# Patient Record
Sex: FEMALE | Race: WHITE | NOT HISPANIC OR LATINO | URBAN - METROPOLITAN AREA
[De-identification: names, ages, dates, MRNs, and addresses within clinical notes are randomized per-mention and may not be internally consistent; named-entity substitution may affect disease eponyms.]

---

## 2022-05-16 ENCOUNTER — WALK IN (OUTPATIENT)
Dept: URGENT CARE | Age: 47
End: 2022-05-16

## 2022-05-16 VITALS
TEMPERATURE: 96.5 F | HEART RATE: 75 BPM | DIASTOLIC BLOOD PRESSURE: 82 MMHG | RESPIRATION RATE: 18 BRPM | SYSTOLIC BLOOD PRESSURE: 122 MMHG

## 2022-05-16 DIAGNOSIS — N30.01 ACUTE CYSTITIS WITH HEMATURIA: Primary | ICD-10-CM

## 2022-05-16 LAB
APPEARANCE, POC: ABNORMAL
BILIRUBIN, POC: NEGATIVE
COLOR, POC: YELLOW
GLUCOSE UR-MCNC: NEGATIVE MG/DL
KETONES, POC: NEGATIVE MG/DL
NITRITE, POC: NEGATIVE
OCCULT BLOOD, POC: ABNORMAL
PH UR: 7.5 [PH] (ref 5–7)
PROT UR-MCNC: NEGATIVE MG/DL
SP GR UR: 1 (ref 1–1.03)
UROBILINOGEN UR-MCNC: 0.2 MG/DL (ref 0–1)
WBC (LEUKOCYTE) ESTERASE, POC: ABNORMAL

## 2022-05-16 PROCEDURE — 81002 URINALYSIS NONAUTO W/O SCOPE: CPT | Performed by: NURSE PRACTITIONER

## 2022-05-16 PROCEDURE — 99203 OFFICE O/P NEW LOW 30 MIN: CPT | Performed by: NURSE PRACTITIONER

## 2022-05-16 RX ORDER — NITROFURANTOIN 25; 75 MG/1; MG/1
100 CAPSULE ORAL 2 TIMES DAILY
Qty: 14 CAPSULE | Refills: 0 | Status: SHIPPED | OUTPATIENT
Start: 2022-05-16 | End: 2022-05-23

## 2022-05-16 RX ORDER — HYDROXYCHLOROQUINE SULFATE 200 MG/1
200 TABLET, FILM COATED ORAL 2 TIMES DAILY
COMMUNITY
Start: 2022-04-20

## 2022-05-16 ASSESSMENT — ENCOUNTER SYMPTOMS
CONSTITUTIONAL NEGATIVE: 1
RESPIRATORY NEGATIVE: 1

## 2024-03-15 ENCOUNTER — OFFICE VISIT (OUTPATIENT)
Dept: INTERNAL MEDICINE CLINIC | Facility: CLINIC | Age: 49
End: 2024-03-15
Payer: COMMERCIAL

## 2024-03-15 VITALS
HEART RATE: 70 BPM | DIASTOLIC BLOOD PRESSURE: 80 MMHG | HEIGHT: 65.35 IN | OXYGEN SATURATION: 97 % | BODY MASS INDEX: 48.23 KG/M2 | RESPIRATION RATE: 18 BRPM | SYSTOLIC BLOOD PRESSURE: 132 MMHG | TEMPERATURE: 97 F | WEIGHT: 293 LBS

## 2024-03-15 DIAGNOSIS — Z13.29 THYROID DISORDER SCREEN: ICD-10-CM

## 2024-03-15 DIAGNOSIS — M05.9 RHEUMATOID ARTHRITIS WITH POSITIVE RHEUMATOID FACTOR, INVOLVING UNSPECIFIED SITE (HCC): Primary | ICD-10-CM

## 2024-03-15 DIAGNOSIS — Z13.228 SCREENING FOR METABOLIC DISORDER: ICD-10-CM

## 2024-03-15 DIAGNOSIS — M17.0 OSTEOARTHRITIS OF BOTH KNEES, UNSPECIFIED OSTEOARTHRITIS TYPE: ICD-10-CM

## 2024-03-15 DIAGNOSIS — Z12.31 ENCOUNTER FOR SCREENING MAMMOGRAM FOR MALIGNANT NEOPLASM OF BREAST: ICD-10-CM

## 2024-03-15 DIAGNOSIS — Z12.11 SCREENING FOR COLON CANCER: ICD-10-CM

## 2024-03-15 DIAGNOSIS — Z13.21 ENCOUNTER FOR VITAMIN DEFICIENCY SCREENING: ICD-10-CM

## 2024-03-15 DIAGNOSIS — E66.01 MORBID OBESITY (HCC): ICD-10-CM

## 2024-03-15 DIAGNOSIS — Z13.0 SCREENING FOR DEFICIENCY ANEMIA: ICD-10-CM

## 2024-03-15 DIAGNOSIS — Z13.220 LIPID SCREENING: ICD-10-CM

## 2024-03-15 PROCEDURE — 99203 OFFICE O/P NEW LOW 30 MIN: CPT | Performed by: FAMILY MEDICINE

## 2024-03-15 PROCEDURE — 3079F DIAST BP 80-89 MM HG: CPT | Performed by: FAMILY MEDICINE

## 2024-03-15 PROCEDURE — 3075F SYST BP GE 130 - 139MM HG: CPT | Performed by: FAMILY MEDICINE

## 2024-03-15 PROCEDURE — 3008F BODY MASS INDEX DOCD: CPT | Performed by: FAMILY MEDICINE

## 2024-03-15 RX ORDER — METHOTREXATE 2.5 MG/1
TABLET ORAL
COMMUNITY
Start: 2019-07-29

## 2024-03-15 RX ORDER — PREDNISONE 5 MG/1
5 TABLET ORAL AS NEEDED
COMMUNITY
Start: 2024-03-14

## 2024-03-15 RX ORDER — METHOTREXATE 2.5 MG/1
20 TABLET ORAL WEEKLY
COMMUNITY
Start: 2024-01-10

## 2024-03-15 RX ORDER — FOLIC ACID 1 MG/1
2 TABLET ORAL 2 TIMES DAILY
COMMUNITY
Start: 2019-07-29

## 2024-03-15 NOTE — PROGRESS NOTES
Yvonne Suarez  9/9/1975    Chief Complaint   Patient presents with    [facility]     ES rm - 1 - Establish care       HPI:   Yvonne Suarez is a 48 year old female who presents to [facility]. She has RA and following with rheumatology at [place]. Also has b/l knee OA and had recent CSI with ortho at [facility]. She is interested in medical bariatrics as well.     Current Outpatient Medications   Medication Sig Dispense Refill    folic acid 1 MG Oral Tab Take 2 tablets (2 mg total) by mouth in the morning and 2 tablets (2 mg total) before bedtime.      Methotrexate Sodium (METHOTREXATE, ARTHRITIS, TABS 2.5 MG OR)       methotrexate 2.5 MG Oral Tab Take 8 tablets (20 mg total) by mouth once a week.      predniSONE 5 MG Oral Tab Take 1 tablet (5 mg total) by mouth as needed.        Not on File   Past Medical History:   Diagnosis Date    Arthritis     Obesity       There is no problem list on file for this patient.     History reviewed. No pertinent surgical history.   Family History   Problem Relation Age of Onset    Cancer Father     Cancer Mother     Obesity Brother     Diabetes Brother       Social History     Socioeconomic History    Marital status: Single   Tobacco Use    Smoking status: Never     Passive exposure: Never    Smokeless tobacco: Never   Vaping Use    Vaping Use: Never used   Substance and Sexual Activity    Alcohol use: Not Currently   Other Topics Concern    Weight Concern Yes         REVIEW OF SYSTEMS:   GENERAL: feels well otherwise    EXAM:   /80 (BP Location: Left arm, Patient Position: Sitting, Cuff Size: large)   Pulse 70   Temp 97.2 °F (36.2 °C) (Temporal)   Resp 18   Ht 5' 5.35\" (1.66 m)   Wt 296 lb 8 oz (134.5 kg)   SpO2 97%   BMI 48.81 kg/m²   GENERAL: Well developed, well nourished,in no apparent distress  HEENT: atraumatic, normocephalic  LUNGS: normal work of breathing  CARDIO: Regular rate    ASSESSMENT AND PLAN:   Yvonne Suarez is a 48 year old female who presents  to establish care    Rheumatoid arthritis with positive rheumatoid factor, involving unspecified site (HCC)  Currently following with rheumatology at [place].  She is looking to transition her care closer to [place].  Referral was placed for [facility] otology.  Will continue current management with her [place] rheumatologist at this time.  - Rheumatology Referral - In Network    Osteoarthritis of both knees, unspecified osteoarthritis type  Had recent CSI with orthopedist at [place] with improvement in symptoms.  CTM    Morbid obesity (HCC)  Referral placed to medical bariatrics.  - [facility] - [place] Location    Encounter for screening mammogram for malignant neoplasm of breast  Mammogram ordered.  - [facility] TEDDY 2D+3D SCREENING BILAT (CPT=77067/03987); Future    Screening for colon cancer  Referral placed for colonoscopy.  - Gastro Referral - In Network    Screening Labs to be done prior to CPE in Oct  - Comp Metabolic Panel (14); Future  - Vitamin B12; Future  - Vitamin D, 25-Hydroxy; Future  - Lipid Panel; Future  - CBC With Differential With Platelet; Future  - TSH W Reflex To Free T4; Future    Follow-up in October for CPE.    All questions were answered and the patient agrees with the plan.     Thank you,  Arnulfo Oneill MD

## 2024-03-19 ENCOUNTER — OFFICE VISIT (OUTPATIENT)
Dept: INTERNAL MEDICINE CLINIC | Facility: CLINIC | Age: 49
End: 2024-03-19
Payer: COMMERCIAL

## 2024-03-19 VITALS
SYSTOLIC BLOOD PRESSURE: 124 MMHG | HEIGHT: 65 IN | RESPIRATION RATE: 16 BRPM | HEART RATE: 70 BPM | WEIGHT: 291 LBS | BODY MASS INDEX: 48.48 KG/M2 | DIASTOLIC BLOOD PRESSURE: 82 MMHG

## 2024-03-19 DIAGNOSIS — K76.0 FATTY LIVER: ICD-10-CM

## 2024-03-19 DIAGNOSIS — E66.01 OBESITY, MORBID, BMI 40.0-49.9 (HCC): ICD-10-CM

## 2024-03-19 DIAGNOSIS — E78.1 HIGH TRIGLYCERIDES: ICD-10-CM

## 2024-03-19 DIAGNOSIS — Z51.81 THERAPEUTIC DRUG MONITORING: Primary | ICD-10-CM

## 2024-03-19 DIAGNOSIS — M17.4 OTHER SECONDARY OSTEOARTHRITIS OF BOTH KNEES: ICD-10-CM

## 2024-03-19 DIAGNOSIS — M05.9 RHEUMATOID ARTHRITIS WITH POSITIVE RHEUMATOID FACTOR, INVOLVING UNSPECIFIED SITE (HCC): ICD-10-CM

## 2024-03-19 PROBLEM — R20.2 PARESTHESIA: Status: ACTIVE | Noted: 2024-03-19

## 2024-03-19 PROCEDURE — 3008F BODY MASS INDEX DOCD: CPT | Performed by: NURSE PRACTITIONER

## 2024-03-19 PROCEDURE — 3074F SYST BP LT 130 MM HG: CPT | Performed by: NURSE PRACTITIONER

## 2024-03-19 PROCEDURE — 3079F DIAST BP 80-89 MM HG: CPT | Performed by: NURSE PRACTITIONER

## 2024-03-19 PROCEDURE — 99204 OFFICE O/P NEW MOD 45 MIN: CPT | Performed by: NURSE PRACTITIONER

## 2024-03-19 RX ORDER — MELOXICAM 15 MG/1
15 TABLET ORAL DAILY
COMMUNITY
Start: 2024-03-13

## 2024-03-19 NOTE — PROGRESS NOTES
HISTORY OF PRESENT ILLNESS  Chief Complaint   Patient presents with    Weight Problem     Recommendation from PCP, never tried med but tries supplement ( xyngular) and open for meds depending on what it is. Kneen problems and rheumatoid      Yvonne Suarez is a 48 year old female new to our office today for initiation of medical weight loss program.  Patient presents today with c/o excess weight. Referred by, PCP     Has been struggling weight gain after having 3 kids (about 22 yrs ago)   Was on xyngular (supplements and shakes)   Admits to having a hard to exercise- osteoarthritis in bilat. Knees (just got knee injections last week)    Denies chest pain, shortness of breath, dizziness, blurred vision, headache, paresthesia, nausea/vomiting.     Reason/goal for weight loss: to lose #50 lbs in 6 months and #100 lbs in 1 yr  Triggers for weight gain?  Soda,  skip meals   Previous weight loss programs? YES:  Weight Watchers- 2005  Previous weight loss medications?  Xyngular (supplements)     Reviewed [facility] patient contract: Readiness for Lifestyle change: 10/10, Interest in Medication: 10/10, Bariatric surgery interest: 0/10    Weight  Starting weight: 291  Max weight: 298  Lowest weight: 250    Wt Readings from Last 6 Encounters:   03/19/24 291 lb (132 kg)   03/15/24 296 lb 8 oz (134.5 kg)        Typical diet   Breakfast Lunch Dinner Snacks Fluids   Cheerios, skip, coffee  skip Omelette, pasta, stirfry  Cookies- 4, chips  Water: adequate   Soda: +2 per day  Juice:  Coffee/Tea: coffee (Citizen of [place] vanilla creamer)      Portion: medium (large for dinner)   Eats 3 meals per day: yes   Number of restaurant or fast food meals/week: 2 meals/week    Social hx and lifestyle reviewed:    Work: schedule [employer] (working from home)   Marital status: single  Support: yes  Tobacco use: none  ETOH use:  0 per/week  Supplements: none  Exercise: none  Stress level: 0/10  Sleep hours and integrity: 4-5 Hours per night    MEDICAL HISTORY  PMH  reviewed:   Cardiac disorders:negative   Depression/anxiety: negative  Glaucoma: negative  Kidney stones: negative  Eating disorder: negative  Migraines/seizures: negative  Joint-related conditions: RA, multiple joint pain, osteoarthritis in bilat knees L>R  Liver disease: fatty liver  Thyroid disease: negative  Constipation: negative  Diabetes: negative  Sleep Apnea hx: n/a   Cancer hx: negative  DVT: negative  Family or personal history of Pancreatic issues / Medullary Thyroid Cancer: negative  History of bariatric surgery: negative    FMH reviewed: obesity in parent/s or sibling: +brothers     REVIEW OF SYSTEMS  GENERAL: feels well otherwise, and negative fatigue   SKIN: denies any rashes to skin folds  HEENT: snoring- no; denies neck thickening  LUNGS: denies shortness of breath with exertion, no apnea  CARDIOVASCULAR: denies chest pain on exertion, denies palpitations or pedal edema  GI: denies abdominal pain, distention, No N/V/D/C  MUSCULOSKELETAL: denies back pain, +joint pains (RA, bilat. Knees L>R)  NEURO: denies headaches or dizziness  ENDOCRINE: denies any excess hunger, urination or thirst, denies any purple striae  PSYCH: denies change in behavior or mood, denies feeling sad or depressed    EXAM  /82   Pulse 70   Resp 16   Ht 5' 5\" (1.651 m)   Wt 291 lb (132 kg)   LMP 01/31/2024 (Approximate)   BMI 48.42 kg/m² , Percent body fat: F >32% Female 49.7%;  visceral fat 19 Muscle Mass: 30.1 lbs%       GENERAL: well developed, well nourished, in no apparent distress  SKIN: warm, pink, dry without rashes to exposed area   EYES: conjunctiva pink, sclera non icteric, PERRLA  HEENT: atraumatic, normocephalic, O/p: Mallampati score- 2  NECK: supple, non tender, no adenopathy, no thyromegaly  LUNGS: CTA in all fields, breathing non labored  CARDIO: RRR without murmur, normal S1 and S2 without clicks or gallops, no pedal edema  GI: +BS, soft, no masses, HSM or tenderness  EXTREMITIES: grossly  intact  NEURO: Oriented times three, full ROM of bilateral UE/LE  PSYCH: pleasant, cooperative, normal mood and affect    No results found for: \"GLU\", \"BUN\", \"BUNCREA\", \"CREATSERUM\", \"ANIONGAP\", \"GFR\", \"GFRNAA\", \"GFRAA\", \"CA\", \"OSMOCALC\", \"ALKPHO\", \"AST\", \"ALT\", \"ALKPHOS\", \"BILT\", \"TP\", \"ALB\", \"GLOBULIN\", \"AGRATIO\", \"NA\", \"K\", \"CL\", \"CO2\"  No results found for: \"EAG\", \"A1C\"  No results found for: \"CHOLEST\", \"TRIG\", \"HDL\", \"LDL\", \"VLDL\", \"TCHDLRATIO\", \"NONHDLC\", \"CHOLHDLRATIO\", \"CALCNONHDL\"  No results found for: \"B12\", \"VITB12\"  No results found for: \"VITD\", \"QVITD\", \"CJIP08MY\"    Current Outpatient Medications on File Prior to Visit   Medication Sig Dispense Refill    Meloxicam 15 MG Oral Tab Take 1 tablet (15 mg total) by mouth daily.      folic acid 1 MG Oral Tab Take 2 tablets (2 mg total) by mouth in the morning and 2 tablets (2 mg total) before bedtime.      methotrexate 2.5 MG Oral Tab Take 8 tablets (20 mg total) by mouth once a week.      predniSONE 5 MG Oral Tab Take 1 tablet (5 mg total) by mouth as needed (Pt taking it when needed).       No current facility-administered medications on file prior to visit.       ASSESSMENT/PLAN    ICD-10-CM    1. Therapeutic drug monitoring  Z51.81 EKG 12 Lead performed at [facility]      2. Obesity, morbid, BMI 40.0-49.9 (HCC)  E66.01 EKG 12 Lead performed at [facility]      3. Fatty liver  K76.0       4. Rheumatoid arthritis with positive rheumatoid factor, involving unspecified site (HCC)  M05.9       5. Other secondary osteoarthritis of both knees  M17.4         Initial Weight Data and Goal Weight Loss:  Weight Calculations  Initial Weight: 291 lbs  Initial Weight Date: 03/19/24  Today's Weight: 291 lbs  5% Goal: 14.55  10% Goal: 29.1  Total Weight Loss: 0 lbs  Initial consult: 291 lbs on 3/19/2024, Down  Lb:  lbs total     PLAN   Will hold on starting medications: if EKG is normal we can think about doing phentermine  --advised of side effects and adverse  effects of this medication  Contradictions: needs EKG before doing stimulant medication   Body composition test results given   Reviewed labs  Continue with vitamin d OTC   HLD  elevated triglyceride, follows with PCP   Fatty liver, lifestyle education done, managed by PCP  RA/joint pain, recommended aqua therapy   Decrease carbs, increase protein, no skipping meals   Needs to incorporate exercise regimen FITTE: ACSM recommendations (150-300 minutes/ week in active weight loss)   Follow up with dietitian and psychologist as recommended.  Discussed the role of sleep and stress in weight management.  Counseled on comprehensive weight loss plan including attention to nutrition, exercise and behavior/stress management for success. See patient instruction below for more details.    Total time spent on chart review, pre-charting, obtaining history, counseling, and educating, reviewing labs was 50 minutes.       NOTE TO PATIENT: The 21st Century Cures Act makes clinical notes like these available to patients in the interest of transparency. Clinical notes are medical documents used by physicians and care providers to communicate with each other. These documents include medical language and terminology, abbreviations, and treatment information that may sound technical and at times possibly unfamiliar. In addition, at times, the verbiage may appear blunt or direct. These documents are one tool providers use to communicate relevant information and clinical opinions of the care providers in a way that allows common understanding of the clinical context.     Weight Loss Contract reviewed and signed today 3/19/2024    Patient Instructions   Welcome to the [facility]!!  Thank you for placing your trust in our health care team, I look forward to working with you along this journey to better health!  Next steps:     1.  Schedule a personal nutrition consultation with one of our registered dieticians.  Bring along your food journal (3 days minimum). See journal options below.  2. Get EKG done    Please try to work on the following dietary changes this first month:  Daily protein recommendation to start:  grams  Daily carbohydrate: <150g  Daily calories: 1,800-1,900  1.  Drink water with meals and throughout the day, cut down on soda and/or juice if consumed. Consider flavored water options like Bubbly, Spindrift, Hint and Mouna.  2.  Eat breakfast daily and focus on having protein with each meal, examples include: greek yogurt, cottage cheese, hard boiled egg, whole grain toast with peanut butter.   3.  Reduce refined carbohydrates and sugars which includes items such as sweets, as well as rice, pasta, and bread and make sure to choose whole grain options when having them with just 1 serving per meal about the size of your inner palm.  4.  Consume non starchy veggies daily working towards making them a good 50% of your daily food intake. Add them to lunch and dinner consistently.  5.  Optional can start a daily probiotic: VSL#3, (order on line at www.vsl3.com). Take 1 capsule daily with water for 30 days, then reduce to 1 every other day (this will reduce the cost). Capsules can be left out for 2 weeks, but then must be refrigerated.      Please download randall My Fitness Pal, LoseIt! Or Net Diary to monitor daily dietary intake and you will be able to see if you are eating the right amount of calories or too much or too little which would hinder weight loss. Additionally this will help to see your daily carbohydrate and protein intake. When you set the randall up choose 1-2 lbs/week as a goal.  Keeping a paper food journal is an option as well to remain accountable for your choices- this is the start to mindful eating! A low calorie diet has been consistently shown to support weight loss.     Continue or start exercising to help establish a routine. If not already exercising begin with 1 day and progress as able  with long-term goal of 30 minutes 5 days a week at a minimum.     Meditation daily can help manage and control stress. Chronic stress can make weight loss difficult.  Exercising is one way to help with stress, but meditation using the CALM Kaitlynn or another comparable alternative can be done in your home or place of work with little time commitment. This Kaitlynn can also help work on behavior change and improve sleep. Check out the segment under Calm Masterclass and listen to The 4 Pillars of Health. A great way to begin learning about the foundation of lifestyle with practical tips to use in your every day.     Check out www.yourweightmatters.org blog for continued daily support and education along this weight loss journey!    Patient Resources:     Personal Training/Fitness Classes/Health Coaching     [facility] @ [email or web address] Full fitness center with group fitness and personal training. Discount available as client of [facility].  Health Coaching and Personal Training with Stefanie Martines at our [facility]- individual weekly coaching with option to add personal training and small group fitness classes targeted at weight loss- [phone] and/or email @ [email or web address]  360FIT [place] [email or web address] Fitness [phone] and/or email Leonie at [email or web address]  [facility] Fitness Centers with multiple locations: RJMetrics (www.[place] [employer]), Eat The 9Lenses Fitness (www.[email or web address] Core.Nirvanix), Zuora (www.Critical Outcome Technologies.Nirvanix), The Exercise  (www.exercisecoach.Nirvanix)     Online Fitness  Fitness  on Utube  Fit in 10 DVD series- www.jhsrb10JIC.com  Sit and Be Fit - Chair exercise series Www.sitandbefit.org  Hip Hop Fit with Josep Ramírez at www.hiphopfit.net     Apps for on the Go Fitness  [place] 7 Minute Workout (orange box with white 7) - free  on the go HIIT training kaitlynn  Peloton Kaitlynn @ www.onepeloton.com     Nutrition Trackers and Tools  LoseIT! And My Fitness Pal apps and on line for tracking nutrition  NOOM - virtual health coaching  FitFoundation (healthy meals on the go) in [place] @ www.knkzajbhjqlty0i.Nevolution  Emre RODRIGUEZ @ www.bistromd.com and Obcrjo61 (keto and low carb plans recommended) @ www.yoozlq16.com, Metabolic Meals @ www.MyMetabolicMeals.com - individual prepared meals to go  Gobble, Blue Apron, Home , Every Plate, Sunbasket- on line meal delivery programs for preparation at home  [place]al [place] in [place] for homemade meals to go @ www.Profex  Diet Doctor @ www.dietdoctor.com - low carb swaps  Yummly - meal prep and planning kaitlynn (www.yummly.com)     Stress Management/Behavior/Mindful Eating  CALM meditation kaitlynn (www.calm.com)  Headspace  Am I Hungry? Mindful eating virtual  kaitlynn  Www.yourweightmatters.org - Obesity Action Coalition sponsored Blog posts daily  Motivation kaitlynn (black box with white \")- daily supportive messages sent to your phone     Books/Video Education/Podcasts  Mindless Eating by Donato Shukla  Why We Get Sick by Oleg Schneider (a book about insulin resistance)  Atomic Habits by Regulo Conway (a book about taking small steps to promote greater behavior change)   Can't Hurt Me by Mateusz Angel (a book exploring the power of discipline in achieving your goals)  The End of Dieting: How to Live for Life by Dr. Shawn Schofield M.D. or listen to The Aveksa Podcast Episode 63: Understanding \"Nutritarian\" Eating w/Dr. Shawn Schofield  Your Body in Balance: The New Science of Food, Hormones, and Health by Dr. Arthur Cardenas  The Menopause Diet Plan by Kaitlin Sands and Nuzhat Cabrera  The Complete Guide to fasting by Dr. Freeman  Sugar, Salt & Fat by Shelby Jolley, Ph.D, R.D.  Weight Loss Surgery Will Not Treat Food Addiction by Cecy Ibrahim Ph.D  The Game Changers- Crowd Fusion Documentary on plant based  nutrition  Fed Up - documentary about obesity (Free on Utube)  The Truth About Sugar - documentary on sugar (Free on Utube, https://youtu.be/3Y5mlspFI1l)  The Dr. Valles T5 Wellness Plan by Dr. Sonny Valles MD  Fitlosophy Fitspiration - journal to better health (found at [employer] in fitness aisle)  What Happened to You?- a look at the impact trauma has on behavior written by Aga Boston and Dr. Rosalio Winkler  Whole Again by Pete Bruno - discovering your true self after trauma  Lis Sosa talk on AnaptysBio, The Call to [employer].  Podcasts: The Exam Room by the Physician's Committee, Nutrition Facts by Dr. Kendrick    We are here to support you with weight loss, but please remember that you still need your primary care provider for your routine health maintenance.      No follow-ups on file.    Patient verbalizes understanding.    Shakila Haro, APRN

## 2024-03-19 NOTE — PATIENT INSTRUCTIONS
Welcome to the [facility]!!  Thank you for placing your trust in our health care team, I look forward to working with you along this journey to better health!  Next steps:     1.  Schedule a personal nutrition consultation with one of our registered dieticians. Bring along your food journal (3 days minimum). See journal options below.  2. Get EKG done    Please try to work on the following dietary changes this first month:  Daily protein recommendation to start:  grams  Daily carbohydrate: <150g  Daily calories: 1,800-1,900  1.  Drink water with meals and throughout the day, cut down on soda and/or juice if consumed. Consider flavored water options like Bubbly, Spindrift, Hint and Mouna.  2.  Eat breakfast daily and focus on having protein with each meal, examples include: greek yogurt, cottage cheese, hard boiled egg, whole grain toast with peanut butter.   3.  Reduce refined carbohydrates and sugars which includes items such as sweets, as well as rice, pasta, and bread and make sure to choose whole grain options when having them with just 1 serving per meal about the size of your inner palm.  4.  Consume non starchy veggies daily working towards making them a good 50% of your daily food intake. Add them to lunch and dinner consistently.  5.  Optional can start a daily probiotic: VSL#3, (order on line at www.vsl3.com). Take 1 capsule daily with water for 30 days, then reduce to 1 every other day (this will reduce the cost). Capsules can be left out for 2 weeks, but then must be refrigerated.      Please download randall My Fitness Pal, LoseIt! Or Net Diary to monitor daily dietary intake and you will be able to see if you are eating the right amount of calories or too much or too little which would hinder weight loss. Additionally this will help to see your daily carbohydrate and protein intake. When you set the randall up choose 1-2 lbs/week as a goal.  Keeping a paper food journal  is an option as well to remain accountable for your choices- this is the start to mindful eating! A low calorie diet has been consistently shown to support weight loss.     Continue or start exercising to help establish a routine. If not already exercising begin with 1 day and progress as able with long-term goal of 30 minutes 5 days a week at a minimum.     Meditation daily can help manage and control stress. Chronic stress can make weight loss difficult.  Exercising is one way to help with stress, but meditation using the CALM Kaitlynn or another comparable alternative can be done in your home or place of work with little time commitment. This Kaitlynn can also help work on behavior change and improve sleep. Check out the segment under Calm Masterclass and listen to The 4 Pillars of Health. A great way to begin learning about the foundation of lifestyle with practical tips to use in your every day.     Check out www.yourweightmatters.org blog for continued daily support and education along this weight loss journey!    Patient Resources:     Personal Training/Fitness Classes/Health Coaching     [facility] @ [email or web address] Full fitness center with group fitness and personal training. Discount available as client of [facility].  Health Coaching and Personal Training with Stefanie Martines at our [facility]- individual weekly coaching with option to add personal training and small group fitness classes targeted at weight loss- [phone] and/or email @ [email or web address]  360[employer] [place] http://www.[employer]. Group Fitness [phone] and/or email [email or web address] [email or web address]  [facility] Fitness Centers with multiple locations: Strix Systems (www.[employer]), Replise The Localist Fitness (www.Keen [employer]), viavoo (www.[employer]), The Exercise   (www.exercisecoach.Intoloop)     Online Fitness  Fitness  on Utube  Fit in 10 DVD series- www.[email or web address].[email or web address]  Sit and Be Fit - Chair exercise series Www.sitandbefit.org  Hip Hop Fit with Josep Ramírez at www.hiphopfit.net     Apps for on the Go Fitness  Sandstone 7 Minute Workout (orange box with white 7) - free on the go HIIT training kaitlynn  Peloton Kaitlynn @ www.onepeloton.com     Nutrition Trackers and Tools  LoseIT! And My Fitness Pal apps and on line for tracking nutrition  NOOM - virtual health coaching  FitFoundation (healthy meals on the go) in [place] @ www.bhpjxbwqzxefo2lKenandy  Bistro MD @ Vivochabistromd.com and Rnngty92 (keto and low carb plans recommended) @ wwwXenex Disinfection Servicesbgvmig22.com, Metabolic Meals @ www.Knight [employer] Car[employer] GroupMetabolicMeals.Intoloop - individual prepared meals to go  Gobble, Blue Apron, Home , Every Plate, Sunbasket- on line meal delivery programs for preparation at home  Meal Village in [place] for homemade meals to go @ wwwXenex Disinfection ServicesmealEspresso Logic  Diet Doctor @ www.dietdoctor.com - low carb swaps  Level [employer] Networks - meal prep and planning kaitlynn (www.yummly.com)     Stress Management/Behavior/Mindful Eating  CALM meditation kaitlynn (www.calm.com)  Headspace  Am I Hungry? Mindful eating virtual  kaitlynn  Www.yourweightmatters.org - Obesity Action Coalition sponsored Blog posts daily  Motivation kaitlynn (black box with white \")- daily supportive messages sent to your phone     Books/Video Education/Podcasts  Mindless Eating by Donato Shukla  Why We Get Sick by Oleg Schneider (a book about insulin resistance)  Atomic Habits by Regulo Conway (a book about taking small steps to promote greater behavior change)   Can't Hurt Me by Mateusz Angel (a book exploring the power of discipline in achieving your goals)  The End of Dieting: How to Live for Life by Dr. Shawn Schofield M.D. or listen to The [employer] Podcast Episode 63: Understanding \"Nutritarian\" Eating w/Dr. Shawn Schofield  Your Body in Balance: The New Science of Food, Hormones, and Health by   Arthur Cardenas  The Menopause Diet Plan by Kaitlin Sands and Nuzhat Cabrera  The Complete Guide to fasting by Dr. Freeman  Sugar, Salt & Fat by Shelby Jolley, Ph.D, R.D.  Weight Loss Surgery Will Not Treat Food Addiction by Cecy Ibrahim Ph.D  The Game Changers- Netflix Documentary on plant based nutrition  Fed Up - documentary about obesity (Free on Utube)  The Truth About Sugar - documentary on sugar (Free on Utube, https://youtu.be/0Z2qfejJR8w)  The Dr. Valles T5 Wellness Plan by Dr. Sonny Valles MD  Fitlosophy Fitspiration - journal to better health (found at [employer] in fitness aisle)  What Happened to You?- a look at the impact trauma has on behavior written by Aga Boston and Dr. Rosalio Winkler  Whole Again by Pete Bruno - discovering your true self after trauma  Lis Sosa talk on Geosign, The Call to Courage  Podcasts: The Exam Room by the Physician's Committee, Nutrition Facts by Dr. Kendrick    We are here to support you with weight loss, but please remember that you still need your primary care provider for your routine health maintenance.

## 2024-03-23 ENCOUNTER — NURSE ONLY (OUTPATIENT)
Dept: LAB | Age: 49
End: 2024-03-23
Attending: FAMILY MEDICINE
Payer: COMMERCIAL

## 2024-03-23 DIAGNOSIS — Z51.81 THERAPEUTIC DRUG MONITORING: ICD-10-CM

## 2024-03-23 DIAGNOSIS — E66.01 OBESITY, MORBID, BMI 40.0-49.9 (HCC): ICD-10-CM

## 2024-03-23 LAB
ATRIAL RATE: 72 BPM
P AXIS: 30 DEGREES
P-R INTERVAL: 138 MS
Q-T INTERVAL: 392 MS
QRS DURATION: 72 MS
QTC CALCULATION (BEZET): 429 MS
R AXIS: -5 DEGREES
T AXIS: 32 DEGREES
VENTRICULAR RATE: 72 BPM

## 2024-03-23 PROCEDURE — 93010 ELECTROCARDIOGRAM REPORT: CPT | Performed by: STUDENT IN AN ORGANIZED HEALTH CARE EDUCATION/TRAINING PROGRAM

## 2024-03-23 PROCEDURE — 93005 ELECTROCARDIOGRAM TRACING: CPT

## 2024-03-25 ENCOUNTER — PATIENT MESSAGE (OUTPATIENT)
Dept: INTERNAL MEDICINE CLINIC | Facility: CLINIC | Age: 49
End: 2024-03-25

## 2024-03-25 DIAGNOSIS — E66.01 OBESITY, MORBID, BMI 40.0-49.9 (HCC): Primary | ICD-10-CM

## 2024-03-25 RX ORDER — PHENTERMINE HYDROCHLORIDE 15 MG/1
15 CAPSULE ORAL EVERY MORNING
Qty: 30 CAPSULE | Refills: 2 | Status: SHIPPED | OUTPATIENT
Start: 2024-03-25

## 2024-03-25 NOTE — TELEPHONE ENCOUNTER
Responding to EKG result    Ms. Suarez,  Your outpatient EKG is normal. Did you want to start phentermine, that we had talked about for appetite suppression?  Sincerely, NU Villegas ...

## 2024-03-25 NOTE — TELEPHONE ENCOUNTER
From: Yvonne Suarez  To: Shakila Haro  Sent: 3/25/2024 10:28 AM CDT  Subject: Weightloss    Good Morning, I definately would like to start. Thank you.

## 2024-03-30 ENCOUNTER — HOSPITAL ENCOUNTER (OUTPATIENT)
Dept: MAMMOGRAPHY | Age: 49
Discharge: HOME | End: 2024-03-30
Attending: FAMILY MEDICINE
Payer: COMMERCIAL

## 2024-03-30 DIAGNOSIS — Z12.31 ENCOUNTER FOR SCREENING MAMMOGRAM FOR MALIGNANT NEOPLASM OF BREAST: ICD-10-CM

## 2024-03-30 PROCEDURE — 77063 BREAST TOMOSYNTHESIS BI: CPT | Performed by: FAMILY MEDICINE

## 2024-03-30 PROCEDURE — 77067 SCR MAMMO BI INCL CAD: CPT | Performed by: FAMILY MEDICINE

## 2024-07-03 ENCOUNTER — HOSPITAL ENCOUNTER (OUTPATIENT)
Dept: GENERAL RADIOLOGY | Age: 49
Discharge: HOME | End: 2024-07-03
Attending: INTERNAL MEDICINE
Payer: COMMERCIAL

## 2024-07-03 ENCOUNTER — TELEPHONE (OUTPATIENT)
Dept: RHEUMATOLOGY | Facility: CLINIC | Age: 49
End: 2024-07-03

## 2024-07-03 ENCOUNTER — OFFICE VISIT (OUTPATIENT)
Dept: RHEUMATOLOGY | Facility: CLINIC | Age: 49
End: 2024-07-03
Payer: COMMERCIAL

## 2024-07-03 ENCOUNTER — LAB ENCOUNTER (OUTPATIENT)
Dept: LAB | Age: 49
End: 2024-07-03
Attending: INTERNAL MEDICINE
Payer: COMMERCIAL

## 2024-07-03 VITALS
BODY MASS INDEX: 46.98 KG/M2 | DIASTOLIC BLOOD PRESSURE: 60 MMHG | HEART RATE: 73 BPM | HEIGHT: 65 IN | WEIGHT: 282 LBS | RESPIRATION RATE: 16 BRPM | SYSTOLIC BLOOD PRESSURE: 100 MMHG | TEMPERATURE: 97 F | OXYGEN SATURATION: 97 %

## 2024-07-03 DIAGNOSIS — Z51.81 THERAPEUTIC DRUG MONITORING: ICD-10-CM

## 2024-07-03 DIAGNOSIS — M05.79 RHEUMATOID ARTHRITIS INVOLVING MULTIPLE SITES WITH POSITIVE RHEUMATOID FACTOR (HCC): ICD-10-CM

## 2024-07-03 DIAGNOSIS — M15.9 OSTEOARTHRITIS OF MULTIPLE JOINTS, UNSPECIFIED OSTEOARTHRITIS TYPE: ICD-10-CM

## 2024-07-03 DIAGNOSIS — Z51.81 THERAPEUTIC DRUG MONITORING: Primary | ICD-10-CM

## 2024-07-03 LAB
ALBUMIN SERPL-MCNC: 3.6 G/DL (ref 3.4–5)
ALBUMIN/GLOB SERPL: 0.8 {RATIO} (ref 1–2)
ALP LIVER SERPL-CCNC: 153 U/L
ALT SERPL-CCNC: 23 U/L
ANION GAP SERPL CALC-SCNC: 8 MMOL/L (ref 0–18)
AST SERPL-CCNC: 11 U/L (ref 15–37)
BASOPHILS # BLD AUTO: 0.06 X10(3) UL (ref 0–0.2)
BASOPHILS NFR BLD AUTO: 0.6 %
BILIRUB SERPL-MCNC: 0.4 MG/DL (ref 0.1–2)
BILIRUB UR QL STRIP.AUTO: NEGATIVE
BUN BLD-MCNC: 11 MG/DL (ref 9–23)
CALCIUM BLD-MCNC: 9 MG/DL (ref 8.5–10.1)
CHLORIDE SERPL-SCNC: 106 MMOL/L (ref 98–112)
CLARITY UR REFRACT.AUTO: CLEAR
CO2 SERPL-SCNC: 24 MMOL/L (ref 21–32)
CREAT BLD-MCNC: 0.8 MG/DL
CRP SERPL-MCNC: 3.88 MG/DL (ref ?–0.3)
EGFRCR SERPLBLD CKD-EPI 2021: 91 ML/MIN/1.73M2 (ref 60–?)
EOSINOPHIL # BLD AUTO: 0.17 X10(3) UL (ref 0–0.7)
EOSINOPHIL NFR BLD AUTO: 1.6 %
ERYTHROCYTE [DISTWIDTH] IN BLOOD BY AUTOMATED COUNT: 13.5 %
ERYTHROCYTE [SEDIMENTATION RATE] IN BLOOD: 61 MM/HR
FASTING STATUS PATIENT QL REPORTED: NO
GLOBULIN PLAS-MCNC: 4.6 G/DL (ref 2.8–4.4)
GLUCOSE BLD-MCNC: 89 MG/DL (ref 70–99)
GLUCOSE UR STRIP.AUTO-MCNC: NORMAL MG/DL
HCT VFR BLD AUTO: 41.9 %
HGB BLD-MCNC: 13.7 G/DL
IGA SERPL-MCNC: 466 MG/DL (ref 70–312)
IGM SERPL-MCNC: 82.1 MG/DL (ref 43–279)
IMM GRANULOCYTES # BLD AUTO: 0.04 X10(3) UL (ref 0–1)
IMM GRANULOCYTES NFR BLD: 0.4 %
IMMUNOGLOBULIN PNL SER-MCNC: 1370 MG/DL (ref 791–1643)
KETONES UR STRIP.AUTO-MCNC: NEGATIVE MG/DL
LEUKOCYTE ESTERASE UR QL STRIP.AUTO: NEGATIVE
LYMPHOCYTES # BLD AUTO: 2.12 X10(3) UL (ref 1–4)
LYMPHOCYTES NFR BLD AUTO: 19.9 %
MCH RBC QN AUTO: 29 PG (ref 26–34)
MCHC RBC AUTO-ENTMCNC: 32.7 G/DL (ref 31–37)
MCV RBC AUTO: 88.6 FL
MONOCYTES # BLD AUTO: 0.57 X10(3) UL (ref 0.1–1)
MONOCYTES NFR BLD AUTO: 5.4 %
NEUTROPHILS # BLD AUTO: 7.68 X10 (3) UL (ref 1.5–7.7)
NEUTROPHILS # BLD AUTO: 7.68 X10(3) UL (ref 1.5–7.7)
NEUTROPHILS NFR BLD AUTO: 72.1 %
NITRITE UR QL STRIP.AUTO: NEGATIVE
OSMOLALITY SERPL CALC.SUM OF ELEC: 285 MOSM/KG (ref 275–295)
PH UR STRIP.AUTO: 6.5 [PH] (ref 5–8)
PLATELET # BLD AUTO: 255 10(3)UL (ref 150–450)
POTASSIUM SERPL-SCNC: 4.1 MMOL/L (ref 3.5–5.1)
PROT SERPL-MCNC: 8.2 G/DL (ref 6.4–8.2)
PROT UR STRIP.AUTO-MCNC: NEGATIVE MG/DL
RBC # BLD AUTO: 4.73 X10(6)UL
RBC UR QL AUTO: NEGATIVE
RHEUMATOID FACT SERPL-ACNC: 42 IU/ML (ref ?–15)
SODIUM SERPL-SCNC: 138 MMOL/L (ref 136–145)
SP GR UR STRIP.AUTO: 1.01 (ref 1–1.03)
URATE SERPL-MCNC: 5.6 MG/DL
UROBILINOGEN UR STRIP.AUTO-MCNC: NORMAL MG/DL
VIT D+METAB SERPL-MCNC: 25.7 NG/ML (ref 30–100)
WBC # BLD AUTO: 10.6 X10(3) UL (ref 4–11)

## 2024-07-03 PROCEDURE — 86334 IMMUNOFIX E-PHORESIS SERUM: CPT

## 2024-07-03 PROCEDURE — 99205 OFFICE O/P NEW HI 60 MIN: CPT | Performed by: INTERNAL MEDICINE

## 2024-07-03 PROCEDURE — 86431 RHEUMATOID FACTOR QUANT: CPT

## 2024-07-03 PROCEDURE — 82306 VITAMIN D 25 HYDROXY: CPT

## 2024-07-03 PROCEDURE — 36415 COLL VENOUS BLD VENIPUNCTURE: CPT

## 2024-07-03 PROCEDURE — 83521 IG LIGHT CHAINS FREE EACH: CPT

## 2024-07-03 PROCEDURE — 82784 ASSAY IGA/IGD/IGG/IGM EACH: CPT

## 2024-07-03 PROCEDURE — 84550 ASSAY OF BLOOD/URIC ACID: CPT

## 2024-07-03 PROCEDURE — 86235 NUCLEAR ANTIGEN ANTIBODY: CPT

## 2024-07-03 PROCEDURE — 73130 X-RAY EXAM OF HAND: CPT | Performed by: INTERNAL MEDICINE

## 2024-07-03 PROCEDURE — 3078F DIAST BP <80 MM HG: CPT | Performed by: INTERNAL MEDICINE

## 2024-07-03 PROCEDURE — 73630 X-RAY EXAM OF FOOT: CPT | Performed by: INTERNAL MEDICINE

## 2024-07-03 PROCEDURE — 3074F SYST BP LT 130 MM HG: CPT | Performed by: INTERNAL MEDICINE

## 2024-07-03 PROCEDURE — 84165 PROTEIN E-PHORESIS SERUM: CPT

## 2024-07-03 PROCEDURE — 86225 DNA ANTIBODY NATIVE: CPT

## 2024-07-03 PROCEDURE — 86140 C-REACTIVE PROTEIN: CPT

## 2024-07-03 PROCEDURE — 71046 X-RAY EXAM CHEST 2 VIEWS: CPT | Performed by: INTERNAL MEDICINE

## 2024-07-03 PROCEDURE — 85025 COMPLETE CBC W/AUTO DIFF WBC: CPT

## 2024-07-03 PROCEDURE — 3008F BODY MASS INDEX DOCD: CPT | Performed by: INTERNAL MEDICINE

## 2024-07-03 PROCEDURE — 81003 URINALYSIS AUTO W/O SCOPE: CPT

## 2024-07-03 PROCEDURE — 80053 COMPREHEN METABOLIC PANEL: CPT

## 2024-07-03 PROCEDURE — 85652 RBC SED RATE AUTOMATED: CPT

## 2024-07-03 RX ORDER — PREDNISONE 5 MG/1
5 TABLET ORAL AS NEEDED
Qty: 30 TABLET | Refills: 1 | Status: SHIPPED | OUTPATIENT
Start: 2024-07-03

## 2024-07-03 RX ORDER — FOLIC ACID 1 MG/1
2 TABLET ORAL DAILY
Qty: 60 TABLET | Refills: 2 | Status: SHIPPED | OUTPATIENT
Start: 2024-07-03

## 2024-07-03 RX ORDER — METHOTREXATE 2.5 MG/1
20 TABLET ORAL WEEKLY
Qty: 32 TABLET | Refills: 2 | Status: SHIPPED | OUTPATIENT
Start: 2024-07-03

## 2024-07-03 NOTE — PATIENT INSTRUCTIONS
OSTEOARTHRITIS    Fast Facts    Though some of the joint changes are irreversible, most patients will not need joint replacement surgery.    OA symptoms (what you feel) can vary greatly among patients.    A rheumatologist can detect arthritis and prescribe the proper treatment. The goal of treatment in OA is to reduce pain and improve function.    Exercise is an important part of OA treatment, because it can decrease joint pain and improve function.    At present, there is no treatment that can reverse the damage of OA in the joints. Researchers are trying to find ways to slow or reverse this joint damage.    Osteoarthritis (also known as OA) is a common joint disease that most often affects middle-age to elderly people. It is commonly referred to as \"wear and tear\" of the joints, but we now know that OA is a disease of the entire joint, involving the cartilage, joint lining, ligaments, and bone. Although it is more common in older people, it is not really accurate to say that the joints are just \"wearing out.\" It is characterized by breakdown of the cartilage (the tissue that cushions the ends of the bones between joints), bony changes of the joints, deterioration of tendons and ligaments, and various degrees of inflammation of the joint lining (called the synovium).    This arthritis tends to occur in the hand joints, spine, hips, knees, and great toes. The lifetime risk of developing OA of the knee is about 46%, and the lifetime risk of developing OA of the hip is 25%, according to the [facility] Osteoarthritis Project, a long-term study from the [facility] and sponsored by the Centers for Disease Control and Prevention (often called the CDC) and the National Institutes of Health.    OA is a top cause of disability in older people. The goal of osteoarthritis treatment is to reduce pain and improve function. There is no cure for the disease, but some treatments attempt to slow disease  progression.         What is osteoarthritis?    OA is a frequently slowly progressive joint disease typically seen in middle-aged to elderly people. In osteoarthritis, the cartilage between the bones in the joint breaks down. This causes the affected bones to slowly get bigger. The joint cartilage often breaks down because of mechanical stress or biochemical changes within the body, causing the bone underneath to fail. OA can occur together with other types of arthritis, such as gout or rheumatoid arthritis.    OA tends to affect commonly used joints such as the hands and spine, and the weight-bearing joints such as the hips and knees. Symptoms include:    Joint pain and stiffness    Knobby swelling at the joint    Cracking or grinding noise with joint movement    Decreased function of the joint    How do you treat osteoarthritis?    There is no proven treatment yet that can reverse joint damage from OA. The goal of osteoarthritis treatment is to reduce pain and improve function of the affected joints. Most often, this is possible with a mixture of physical measures and drug therapy and, sometimes, surgery.    Physical measures: Weight loss and exercise are useful in OA. Excess weight puts stress on your knee joints and hips and low back. For every 10 pounds of weight you lose over 10 years, you can reduce the chance of developing knee OA by up to 50 percent. Exercise can improve your muscle strength, decrease joint pain and stiffness, and lower the chance of disability due to OA. Also helpful are support (\"assistive\") devices, such as orthotics or a walking cane, that help you do daily activities. Heat or cold therapy can help relieve OA symptoms for a short time.    Certain alternative treatments such as spa (hot tub), massage, and chiropractic manipulation can help relieve pain for a short time. They can be costly, though, and require repeated treatments. Also, the long-term benefits of these alternative  (sometimes called complementary or integrative) medicine treatments are unproven but are under study.    Drug therapy: Forms of drug therapy include topical, oral (by mouth) and injections (shots). You apply topical drugs directly on the skin over the affected joints. These medicines include capsaicin cream, lidocaine and diclofenac gel. Oral pain relievers such as acetaminophen are common first treatments. So are nonsteroidal anti-inflammatory drugs (often called NSAIDs), which decrease swelling and pain.    In 2010, the government (FDA) approved the use of duloxetine (Cymbalta) for chronic (long-term) musculoskeletal pain including from OA. This oral drug is not new. It also is in use for other health concerns, such as mood disorders, nerve pain and fibromyalgia.    Patients with more serious pain may need stronger medications, such as prescription narcotics.    Joint injections with corticosteroids (sometimes called cortisone shots) or with a form of lubricant called hyaluronic acid can give months of pain relief from OA. This lubricant is given in the knee, and these shots may help delay the need for a knee replacement by a few years in some patients.    Surgery: Surgical treatment becomes an option for severe cases. This includes when the joint has serious damage, or when medical treatment fails to relieve pain and you have major loss of function. Surgery may involve arthroscopy, repair of the joint done through small incisions (cuts). If the joint damage cannot be repaired, you may need a joint replacement.    Supplements: Many over-the-counter nutrition supplements have been used for osteoarthritis treatment. Most lack good research data to support their effectiveness and safety. Among the most widely used are calcium, vitamin D and omega-3 fatty acids. To ensure safety and avoid drug interactions, consult your doctor or pharmacist before using any of these supplements. This is especially true when you are  combining these supplements with prescribed

## 2024-07-03 NOTE — PROGRESS NOTES
[facility], [address] [place] [address]      Consult     Yvonne Suarez Patient Status:  No patient class for patient encounter    1975 MRN [id number]   Location [address] Attending No att. providers found   Hosp Day # 0 PCP Arnulfo Oneill MD     Referring Provider: PCP    Reason for Consultation:   Component  Ref Range & Units 19 10:26 AM   ANTI-CCP, IGG  <5.0 U/.2 High    Comment: Approximately 70% of patients with clinically confirmed RA (based on ACR  criteria) test positive (>4.9 U/mL) by this method. Negative results are  observed in virtually 100% of asymptomatic, apparently healthy males     Component  Ref Range & Units 19 10:26 AM   RHEUMATOID FACTOR  <20 IU/ High      Subjective:    Yvonne Suarez is a 48 year old female with diagnosed with seropositive RA diagnosed initially based on swelling of knees and osteoarthritis and then hand swelling and pain.diagnosed around .    Prednisone first and methotrexate 20mg once week, folic 2mg daily     Was on Humira at some point was a cost issues (around till about 6 months) Only on 3 months.     No enbrel. On plaquenil didn't help much. NO other biologicals    Stiffness and swelling on hands wrists; some pain and swelling possible discomfort in feet.     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     Denies any history of DVT PE TIA CVA seizures migraines or headaches    States no shortness of breath ,chest pain ,fevers ,chills    States no urinary or bowel symptoms; bloody  stools    States no headaches jaw pain, vision changes    States no history of pericardial, pleural effusions    States no significant dry eyes or dry mouth    States no history of uveitis iritis scleritis    States no history of Raynaud's or digital ulcerations    States no major weight changes; night sweats    Wt Readings from Last 6 Encounters:   07/03/24 282 lb (127.9 kg)   03/19/24 291 lb (132 kg)   03/15/24 296 lb 8 oz (134.5 kg)         Her weight has been stable (trying to loose) (phentermaine March 2024)     States no history of photosensitive or malar rash.    States no history of psoriasis    Denies any depression anxiety or insomnia  ( 3 kids 28, 26 and 22) was in 20s )(some nonrestorastlve sleep    History/Other:      Past Medical History:  Past Medical History:    Arthritis    Obesity        Past Surgical History: History reviewed. No pertinent surgical history.    Social History:  reports that she has never smoked. She has never been exposed to tobacco smoke. She has never used smokeless tobacco. She reports that she does not currently use alcohol. She reports that she does not use drugs.    Family History:   Family History   Problem Relation Age of Onset    Cancer Father     Cancer Mother     Obesity Brother     Diabetes Brother        Allergies: No Known Allergies    Current Medications:  Current Outpatient Medications   Medication Sig Dispense Refill    methotrexate 2.5 MG Oral Tab Take 8 tablets (20 mg total) by mouth once a week. 32 tablet 2    folic acid 1 MG Oral Tab Take 2 tablets (2 mg total) by mouth daily. 60 tablet 2    predniSONE 5 MG Oral Tab Take 1 tablet (5 mg total) by mouth as needed (Pt taking it when needed). 30 tablet 1    Phentermine HCl 15 MG Oral Cap Take 1 capsule (15 mg total) by mouth every morning. 30 capsule 2      No current facility-administered medications for this visit.     (Not in a hospital admission)      Review of Systems:     Constitutional: Negative for chills,  , fatigue, fever and unexpected weight change.    HENT: Negative for congestion, and mouth sores.    Eyes: Negative for photophobia, pain, redness and visual disturbance.    Respiratory: Negative for apnea, cough, chest tightness, shortness of breath, wheezing and stridor.    Cardiovascular: Negative for chest pain, palpitations and leg swelling.    Gastrointestinal: Negative for abdominal distention, abdominal pain, blood in stool, constipation, diarrhea and nausea.    Endocrine: Negative.     Genitourinary: Negative for decreased urine volume, difficulty urinating, dyspareunia, dysuria, flank pain, and frequency.    Musculoskeletal: + arthralgias, gait problem + joint swelling.    Skin: Negative for color change, pallor and rash. No raynauds or digital ulcerations no sclerodactly.    Allergic/Immunologic: Negative.    Neurological: Negative for dizziness, tremors, seizures, syncope, speech difficulty, weakness, light-headedness, numbness and headaches.    Hematological: Does not bruise/bleed easily.    Psychiatric/Behavioral: Negative for confusion, decreased concentration, hallucinations, self-injury, sleep disturbance and suicidal ideas or depression.    Objective:   Vitals:    07/03/24 1149   BP: 100/60   Pulse: 73   Resp: 16   Temp: 97.3 °F (36.3 °C)          Constitutional: is oriented to person, place, and time. Appears well-developed and well-nourished. No distress.    HEENT: Normocephalic; EOMI; no jvd; no LAD; no oral or nasal ulcers.     Eyes: Conjunctivae and EOM are normal. Pupils are equal, round, and reactive to light.     Neck: Normal range of motion. No thyromegaly present.    Cardiovascular: RRR, no murmurs.    Lungs: Clear, Bilateral air entry, no wheezes.    Abdominal: Soft.    Musculoskeletal:         Joint Exam 07/03/2024        Right  Left   Wrist  Swollen Tender  Swollen Tender   CMC  Swollen Tender  Swollen Tender   MCP 1  Swollen Tender  Swollen Tender   MCP 2  Swollen Tender  Swollen  Tender   MCP 3  Swollen Tender  Swollen Tender   MCP 4  Swollen Tender  Swollen Tender   PIP 2 (finger)     Swollen Tender   PIP 3 (finger)     Swollen Tender   PIP 4 (finger)  Swollen Tender      MTP 2  Swollen Tender  Swollen Tender   MTP 3  Swollen Tender  Swollen Tender   MTP 4  Swollen Tender  Swollen Tender   MTP 5     Swollen Tender   PIP 2 (toe)     Swollen Tender   PIP 3 (toe)  Swollen Tender  Swollen Tender   PIP 4 (toe)  Swollen Tender  Swollen Tender   DIP 2 (toe)     Swollen Tender        Swollen: 28      Tender: 28          Right shoulder: Exhibits normal range of motion on abduction and internal rotation, no tenderness, no bony tenderness, no deformity, no laceration, no pain and no spasm.        Left shoulder: Exhibits normal range of motion on abduction and internal and external rotation.  no tenderness, no bony tenderness, no swelling, no effusion, no deformity, no pain, no spasm and normal strength.        Right elbow:  Exhibits normal range of motion, no swelling, no effusion and no deformity. No tenderness found. No medial epicondyle, no lateral epicondyle and no olecranon process tenderness noted. There are no contractures or tophi or nodules.        Left elbow:  Normal range of motion, no swelling, no effusion and no deformity. No medial epicondyle, no lateral epicondyle and no olecranon process tenderness noted. There are no contractures or tophi or nodules.        Right wrist:  Exhibits normal range of motion, no tenderness, no bony tenderness, no swelling, no effusion and no crepitus. Flexion and extension intact w/o limitation.        Left wrist: Exhibits normal range of motion, no tenderness, no bony tenderness, no swelling, no effusion, no crepitus and no deformity. Flexion and extension intact without limitation.        Right hip: Exhibits normal range of motion, normal strength, no tenderness, no bony tenderness, no swelling and no crepitus.        Right hand: No synovitis of DIP  joints; no Bouchards or Heberden nodules noted;  strength: 100%.mild squaring of 1st cmc joint         Left hand: No synovitis of  or DIP joints; no Bouchards or Heberden nodules noted;  strength: 100%. Mild squaring of 1st cmc joint         Left hip: Exhibits normal range of motion, normal strength, no tenderness, no bony tenderness, No swelling and no crepitus.        Right knee: Exhibits normal range of motion, no swelling, no effusion, no ecchymosis, no deformity and no erythema. No tenderness found. No medial joint line, no lateral joint line, no MCL and no LCL tenderness noted. mild crepitation on flexion of knee and extension normal.        Left knee:  Exhibits normal range of motion, no swelling, no effusion, no ecchymosis and no erythema. No tenderness found. No medial joint line, no lateral joint line and no patellar tendon tenderness noted. mild crepitation on flexion of the knee. Extension intact and normal.        Right ankle: No swelling, no deformity. No tenderness. Dorsiflexion and plantar flexion intact without limitation in range of motion.        Left ankle: Exhibits no swelling. No tenderness. No lateral malleolus and no medial malleolus tenderness found. Achilles tendon normal. Achilles tendon exhibits no pain, no defect and normal Miles's test results.  Dorsiflexion and plantar flexion intact without limitation in range of motion.        Cervical back: Exhibits normal range of motion, no tenderness, no bony tenderness, no swelling, no pain and + spasm.        Thoracic back: Exhibits normal range of motion, no tenderness, no bony tenderness and + spasm.        Lumbar back:  Exhibits normal range of motion, no tenderness, no bony tenderness, no pain and + spasm.        Right foot: normal. There is normal range of motion, no tenderness, no bony tenderness, no crepitus and no laceration. There is + synovitis no  tenderness of the MTP joints to palpation.        Left foot: normal. There  is normal range of motion, no tenderness, no bony tenderness and no crepitus. There is + synovitis no  tenderness of the MTP joints to palpation.    Lymphadenopathy: No submental, no submandibular, and no occipital adenopathy present, has no cervical adenopathy or axillary lympadenopathy.    Neurological: Alert and oriented. No focal motor or sensory abnormalities. Strength is 5/5 Upper Extremities/Lower Extremities proximally and distally.    Skin: Skin is warm, dry and intact.  No rashes no purpuric petechial lesions    Psychiatric: Normal behavior.    Results:    Labs:      No results found for: \"WBC\", \"RBC\", \"HGB\", \"HCT\", \"MCV\", \"MCH\", \"MCHC\", \"RDW\", \"PLT\", \"MPV\", \"LYMPHOCYTES\", \"NEUT\"    No components found for: \"RELY\", \"NMET\", \"MYEL\", \"PROMY\", \"FLORENTINO\", \"ABSNEUTS\", \"ABSBANDS\", \"ABMM\", \"ABMY\", \"ABPM\", \"ABBL\"      No results found for: \"NA\", \"K\", \"CHLORIDE\", \"CO2\", \"BUN\", \"CREAT\", \"GFR\", \"ALB\", \"ALKPHOS\", \"AST\", \"ALT\"       No components found for: \"ESRWESTERGRN\"       No results found for: \"CRP\"      No results found for: \"COLOR\", \"CLARITY\", \"UROBILINOGEN\", \"YEAST\"  @LABRCNTIP(RF,B12)@      [unfilled]    Imaging:  No results found.    This result has an attachment that is not available.  IMAGIN views Bilateral knee(s) with Gas and Lateral with weightbearing AP,  PA.  Medial compartment with L>R joint space narrowing subchondral sclerosis  subchondral cysts , Lateral compartment with  joint space narrowing  subchondral sclerosis . There are  periarticular osteophytes.    Patellofemoral compartment with  joint space narrowing  retropatellar  osteophytes.  Varus alignment    IMPRESSION:  Severe Bilateral knee Degenerative changes  Exam End: 24 10:01 AM Last Resulted: 24  5:49 PM   Received From: [facility]  Result Received: 24 10:31 AM       EXAM: PA, AP, Oblique, and Lateral views of the hand and wrist  bilaterally    DATE: 2019 10:50 AM    CLINICAL INDICATION: hand  stiffness and FH or RA    COMPARISON: None.    FINDINGS:    Bones: There is no acute fracture. There is preserved, symmetrical  bone density.    Joints: Joint spaces are preserved without degenerative/proliferative  changes or bony erosions.    Soft Tissues: There is no focal soft tissue swelling. There are no  periarticular soft tissue calcifications or chondrocalcinosis.    IMPRESSION:  Normal views of the hands and wrists bilaterally without findings of  inflammatory or erosive arthropathy.             Assessment & Plan:      48-year-old woman comes in for further evaluation for seropositive rheumatoid arthritis and osteoarthritis    Seropositive rheumatoid arthritis positive CCP and RF  Osteoarthritis multiple joint  Encounter for drug monitoring  Chronic prednisone on and off  Severe osteoarthritis of the knees followed by Ortho    Patient has moderate synovitis on exam  We will get baseline x-rays of the hands and feet baseline chest x-ray  And hepatitis BC antibodies normal in 2022  Update TB testing basic autoimmune workup  Continue prednisone 5 mg daily for 4 weeks and then stop  Patient failed Humira before on methotrexate 20 mg once a week folic acid 1 mg daily  Will switch patient to Enbrel 50 mg subcu once a week  Pending labs for methotrexate also given to patient after baseline testing today to be done every 2 to 3 months  Courage weight loss exercise quad strengthening    She is responding to steroid injection is going for Synvisc injections with Ortho July 2024.  She is not ready for knee replacement yet.    Potential risks and side effects of Biologics discussed in detail    Education and counseling provided to patient.  Instructed patient to call my office or seek medical attention immediately if symptoms worsen. Risks and side effects of medications and diagnosis discussed in detail and patient was given written information on new prescribed medications.    Return to clinic:  Return in about 4  months (around 11/3/2024).    Christie Thomas MD  7/3/2024

## 2024-07-03 NOTE — TELEPHONE ENCOUNTER
Please start paperwork on Enbrel 50 mg subcu week for seropositive rheumatoid arthritis    Methotrexate prednisone failed Humira    Patient is getting labs today does have recent updated hepatitis blood work.  Updating TB chest x-ray and basic labs she is going next-door

## 2024-07-05 LAB
DSDNA IGG SERPL IA-ACNC: 1.3 IU/ML
ENA RNP IGG SER IA-ACNC: 2.5 U/ML
ENA SM IGG SER IA-ACNC: <0.7 U/ML
ENA SS-A IGG SER IA-ACNC: <0.4 U/ML
ENA SS-B IGG SER IA-ACNC: 0.5 U/ML
U1 SNRNP IGG SER IA-ACNC: 1.9 U/ML

## 2024-07-08 ENCOUNTER — TELEPHONE (OUTPATIENT)
Dept: RHEUMATOLOGY | Facility: CLINIC | Age: 49
End: 2024-07-08

## 2024-07-08 DIAGNOSIS — E55.9 VITAMIN D DEFICIENCY: Primary | ICD-10-CM

## 2024-07-08 LAB
ALBUMIN SERPL ELPH-MCNC: 3.76 G/DL (ref 3.75–5.21)
ALBUMIN/GLOB SERPL: 1 {RATIO} (ref 1–2)
ALPHA1 GLOB SERPL ELPH-MCNC: 0.34 G/DL (ref 0.19–0.46)
ALPHA2 GLOB SERPL ELPH-MCNC: 0.91 G/DL (ref 0.48–1.05)
B-GLOBULIN SERPL ELPH-MCNC: 1.2 G/DL (ref 0.68–1.23)
GAMMA GLOB SERPL ELPH-MCNC: 1.3 G/DL (ref 0.62–1.7)
KAPPA LC FREE SER-MCNC: 2.15 MG/DL (ref 0.33–1.94)
KAPPA LC FREE/LAMBDA FREE SER NEPH: 1.15 {RATIO} (ref 0.26–1.65)
LAMBDA LC FREE SERPL-MCNC: 1.87 MG/DL (ref 0.57–2.63)
PROT SERPL-MCNC: 7.5 G/DL (ref 5.7–8.2)

## 2024-07-08 NOTE — TELEPHONE ENCOUNTER
omponent  Ref Range & Units 5 d ago   Vitamin D, 25OH, Total  30.0 - 100.0 ng/mL 25.7 Low    Comment: Literature Recommendations for 25(OH)D levels are:  Range           Vitamin D Status   <20    ng/mL      Deficiency   20-<30 ng/mL      Insufficiency    ng/mL      Sufficiency     Patient's vitamin D is low 25.7.    Would do high-dose vitamin D 50,000 units once a week for 3 months then OTC at least 3 to 4000 IU D3      Patient agrees please send prescription back to me and I will okay

## 2024-07-08 NOTE — TELEPHONE ENCOUNTER
Patient's chest x-ray hand foot x-rays normal TB testing hepatitis normal    Proceed with Enbrel as we discussed.  If patient has any additional questions let me know

## 2024-07-08 NOTE — TELEPHONE ENCOUNTER
Left detailed message on patient's voicemail regarding the below message:    omponent  Ref Range & Units 5 d ago   Vitamin D, 25OH, Total  30.0 - 100.0 ng/mL 25.7 Low    Comment: Literature Recommendations for 25(OH)D levels are:  Range           Vitamin D Status   <20    ng/mL      Deficiency   20-<30 ng/mL      Insufficiency    ng/mL      Sufficiency      Patient's vitamin D is low 25.7.     Would do high-dose vitamin D 50,000 units once a week for 3 months then OTC at least 3 to 4000 IU D3        Patient agrees please send prescription back to me and I will okay           Patient was asked to call the office back with any questions she may have. Script pended below for your approval.

## 2024-07-09 RX ORDER — ERGOCALCIFEROL 1.25 MG/1
50000 CAPSULE ORAL WEEKLY
Qty: 12 CAPSULE | Refills: 0 | Status: SHIPPED | OUTPATIENT
Start: 2024-07-09 | End: 2024-08-08

## 2024-07-13 ENCOUNTER — LAB ENCOUNTER (OUTPATIENT)
Dept: LAB | Age: 49
End: 2024-07-13
Attending: INTERNAL MEDICINE
Payer: COMMERCIAL

## 2024-07-13 DIAGNOSIS — Z51.81 THERAPEUTIC DRUG MONITORING: ICD-10-CM

## 2024-07-13 DIAGNOSIS — M05.79 RHEUMATOID ARTHRITIS INVOLVING MULTIPLE SITES WITH POSITIVE RHEUMATOID FACTOR (HCC): ICD-10-CM

## 2024-07-13 PROCEDURE — 86480 TB TEST CELL IMMUN MEASURE: CPT | Performed by: INTERNAL MEDICINE

## 2024-07-15 LAB
M TB IFN-G CD4+ T-CELLS BLD-ACNC: 0.01 IU/ML
M TB TUBERC IFN-G BLD QL: NEGATIVE
M TB TUBERC IGNF/MITOGEN IGNF CONTROL: 3.34 IU/ML
QFT TB1 AG MINUS NIL: 0 IU/ML
QFT TB2 AG MINUS NIL: 0.01 IU/ML

## 2024-07-15 NOTE — TELEPHONE ENCOUNTER
Spoke to pt, notified her of test results per Dr. Thomas. chest x-ray hand foot x-rays normal TB testing hepatitis normal. Pt voices understanding

## 2024-07-27 ENCOUNTER — PATIENT MESSAGE (OUTPATIENT)
Dept: INTERNAL MEDICINE CLINIC | Facility: CLINIC | Age: 49
End: 2024-07-27

## 2024-07-27 DIAGNOSIS — E66.01 OBESITY, MORBID, BMI 40.0-49.9 (HCC): ICD-10-CM

## 2024-07-29 RX ORDER — PHENTERMINE HYDROCHLORIDE 15 MG/1
15 CAPSULE ORAL EVERY MORNING
Qty: 30 CAPSULE | Refills: 0 | Status: SHIPPED | OUTPATIENT
Start: 2024-07-29

## 2024-07-29 NOTE — TELEPHONE ENCOUNTER
Requesting phentermine  LOV: 3/19/24  RTC: 3 months  Last Relevant Labs: na  Filled: 3/25/24 #30 with 2 refills last [place] 4/28/24 [place]    Future Appointments   Date Time Provider Department Center   10/10/2024  9:00 AM Arnulof Oneill MD [facility] [address] [facility]   11/6/2024 12:40 PM Christie Thomas MD [id number] [address]   11/25/2024  9:40 AM Shakila Haro APRN [facility]

## 2024-07-29 NOTE — TELEPHONE ENCOUNTER
From: Yvonne Suarez  To: Rachel Fetter  Sent: 7/27/2024 9:46 AM CDT  Subject: Phentermine    Hello, I am sorry I had to cancel my appt I couldn't get out of a meeting. I did reschedule however it showed Nov being your earliest appt. I have lost almost 20lbs and I only have 1 week left of the phentermine. I was wondering if I was able to get a refill until my appt. Thank you.

## 2024-08-06 NOTE — TELEPHONE ENCOUNTER
Spoke to pt, pt has received her prescriptions for Enbrel. Doing well, and denies questions or concerns.

## 2024-09-04 ENCOUNTER — TELEPHONE (OUTPATIENT)
Dept: RHEUMATOLOGY | Facility: CLINIC | Age: 49
End: 2024-09-04

## 2024-09-04 ENCOUNTER — OFFICE VISIT (OUTPATIENT)
Dept: RHEUMATOLOGY | Facility: CLINIC | Age: 49
End: 2024-09-04
Payer: COMMERCIAL

## 2024-09-04 VITALS
BODY MASS INDEX: 47.65 KG/M2 | DIASTOLIC BLOOD PRESSURE: 70 MMHG | SYSTOLIC BLOOD PRESSURE: 130 MMHG | HEIGHT: 65 IN | OXYGEN SATURATION: 98 % | TEMPERATURE: 97 F | WEIGHT: 286 LBS | HEART RATE: 84 BPM | RESPIRATION RATE: 16 BRPM

## 2024-09-04 DIAGNOSIS — M15.0 PRIMARY OSTEOARTHRITIS INVOLVING MULTIPLE JOINTS: ICD-10-CM

## 2024-09-04 DIAGNOSIS — Z51.81 THERAPEUTIC DRUG MONITORING: ICD-10-CM

## 2024-09-04 DIAGNOSIS — M05.719 RHEUMATOID ARTHRITIS INVOLVING SHOULDER WITH POSITIVE RHEUMATOID FACTOR, UNSPECIFIED LATERALITY (HCC): Primary | ICD-10-CM

## 2024-09-04 PROCEDURE — 99214 OFFICE O/P EST MOD 30 MIN: CPT | Performed by: INTERNAL MEDICINE

## 2024-09-04 PROCEDURE — 3008F BODY MASS INDEX DOCD: CPT | Performed by: INTERNAL MEDICINE

## 2024-09-04 PROCEDURE — 3078F DIAST BP <80 MM HG: CPT | Performed by: INTERNAL MEDICINE

## 2024-09-04 PROCEDURE — 3075F SYST BP GE 130 - 139MM HG: CPT | Performed by: INTERNAL MEDICINE

## 2024-09-04 RX ORDER — MEDROXYPROGESTERONE ACETATE 150 MG/ML
50 INJECTION, SUSPENSION INTRAMUSCULAR WEEKLY
COMMUNITY
Start: 2024-07-12

## 2024-09-04 NOTE — PATIENT INSTRUCTIONS
OSTEOARTHRITIS    Fast Facts    Though some of the joint changes are irreversible, most patients will not need joint replacement surgery.    OA symptoms (what you feel) can vary greatly among patients.    A rheumatologist can detect arthritis and prescribe the proper treatment. The goal of treatment in OA is to reduce pain and improve function.    Exercise is an important part of OA treatment, because it can decrease joint pain and improve function.    At present, there is no treatment that can reverse the damage of OA in the joints. Researchers are trying to find ways to slow or reverse this joint damage.    Osteoarthritis (also known as OA) is a common joint disease that most often affects middle-age to elderly people. It is commonly referred to as \"wear and tear\" of the joints, but we now know that OA is a disease of the entire joint, involving the cartilage, joint lining, ligaments, and bone. Although it is more common in older people, it is not really accurate to say that the joints are just \"wearing out.\" It is characterized by breakdown of the cartilage (the tissue that cushions the ends of the bones between joints), bony changes of the joints, deterioration of tendons and ligaments, and various degrees of inflammation of the joint lining (called the synovium).    This arthritis tends to occur in the hand joints, spine, hips, knees, and great toes. The lifetime risk of developing OA of the knee is about 46%, and the lifetime risk of developing OA of the hip is 25%, according to the [facility] Osteoarthritis Project, a long-term study from the [facility] and sponsored by the Centers for Disease Control and Prevention (often called the CDC) and the National Institutes of Health.    OA is a top cause of disability in older people. The goal of osteoarthritis treatment is to reduce pain and improve function. There is no cure for the disease, but some treatments attempt to slow disease  progression.         What is osteoarthritis?    OA is a frequently slowly progressive joint disease typically seen in middle-aged to elderly people. In osteoarthritis, the cartilage between the bones in the joint breaks down. This causes the affected bones to slowly get bigger. The joint cartilage often breaks down because of mechanical stress or biochemical changes within the body, causing the bone underneath to fail. OA can occur together with other types of arthritis, such as gout or rheumatoid arthritis.    OA tends to affect commonly used joints such as the hands and spine, and the weight-bearing joints such as the hips and knees. Symptoms include:    Joint pain and stiffness    Knobby swelling at the joint    Cracking or grinding noise with joint movement    Decreased function of the joint    How do you treat osteoarthritis?    There is no proven treatment yet that can reverse joint damage from OA. The goal of osteoarthritis treatment is to reduce pain and improve function of the affected joints. Most often, this is possible with a mixture of physical measures and drug therapy and, sometimes, surgery.    Physical measures: Weight loss and exercise are useful in OA. Excess weight puts stress on your knee joints and hips and low back. For every 10 pounds of weight you lose over 10 years, you can reduce the chance of developing knee OA by up to 50 percent. Exercise can improve your muscle strength, decrease joint pain and stiffness, and lower the chance of disability due to OA. Also helpful are support (\"assistive\") devices, such as orthotics or a walking cane, that help you do daily activities. Heat or cold therapy can help relieve OA symptoms for a short time.    Certain alternative treatments such as spa (hot tub), massage, and chiropractic manipulation can help relieve pain for a short time. They can be costly, though, and require repeated treatments. Also, the long-term benefits of these alternative  (sometimes called complementary or integrative) medicine treatments are unproven but are under study.    Drug therapy: Forms of drug therapy include topical, oral (by mouth) and injections (shots). You apply topical drugs directly on the skin over the affected joints. These medicines include capsaicin cream, lidocaine and diclofenac gel. Oral pain relievers such as acetaminophen are common first treatments. So are nonsteroidal anti-inflammatory drugs (often called NSAIDs), which decrease swelling and pain.    In 2010, the government (FDA) approved the use of duloxetine (Cymbalta) for chronic (long-term) musculoskeletal pain including from OA. This oral drug is not new. It also is in use for other health concerns, such as mood disorders, nerve pain and fibromyalgia.    Patients with more serious pain may need stronger medications, such as prescription narcotics.    Joint injections with corticosteroids (sometimes called cortisone shots) or with a form of lubricant called hyaluronic acid can give months of pain relief from OA. This lubricant is given in the knee, and these shots may help delay the need for a knee replacement by a few years in some patients.    Surgery: Surgical treatment becomes an option for severe cases. This includes when the joint has serious damage, or when medical treatment fails to relieve pain and you have major loss of function. Surgery may involve arthroscopy, repair of the joint done through small incisions (cuts). If the joint damage cannot be repaired, you may need a joint replacement.    Supplements: Many over-the-counter nutrition supplements have been used for osteoarthritis treatment. Most lack good research data to support their effectiveness and safety. Among the most widely used are calcium, vitamin D and omega-3 fatty acids. To ensure safety and avoid drug interactions, consult your doctor or pharmacist before using any of these supplements. This is especially true when you are  combining these supplements with prescribed

## 2024-09-04 NOTE — TELEPHONE ENCOUNTER
Forms received via in office.  Forms sent to forms department via email and original forms sent via interoffice.      Any special requests: Please send completed form to patients mychart.

## 2024-09-04 NOTE — PROGRESS NOTES
[facility], [address] [place] [address]      Consult     Yvonne Suarez Patient Status:  No patient class for patient encounter    1975 MRN [id number]   Location [address] Attending No att. providers found   Hosp Day # 0 PCP Arnulfo Oneill MD     Referring Provider: PCP    Reason for Consultation:   Component  Ref Range & Units 19 10:26 AM   ANTI-CCP, IGG  <5.0 U/.2 High    Comment: Approximately 70% of patients with clinically confirmed RA (based on ACR  criteria) test positive (>4.9 U/mL) by this method. Negative results are  observed in virtually 100% of asymptomatic, apparently healthy males     Component  Ref Range & Units 19 10:26 AM   RHEUMATOID FACTOR  <20 IU/ High      Subjective:    Yvonne Suarez is a 48 year old female with diagnosed with seropositive RA diagnosed initially based on swelling of knees and osteoarthritis and then hand swelling and pain.diagnosed around .    Prednisone first and methotrexate 20mg once week, folic 2mg daily     Was on Humira at some point was a cost issues (around till about 6 months) Only on 3 months.     No enbrel. On plaquenil didn't help much. NO other biologicals    Stiffness and swelling on hands wrists; some pain and swelling possible discomfort in feet.     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     Denies any history of DVT PE TIA CVA seizures migraines or headaches    States no shortness of breath ,chest pain ,fevers ,chills    States no urinary or bowel symptoms; bloody  stools    Her weight has been stable (trying to loose) now she is off (phentermaine March 2024)     Denies any depression anxiety or insomnia  ( 3 kids 28, 26 and 22) was in 20s )(some nonrestorastlve sleep    Patient was seen as a new patient July 2024    Diagnosis of seropositive rheumatoid arthritis    X-rays of the hands and feet normal chest x-ray normal in July 2024    Hepatitis blood work TB testing normal    Patient was started on Enbrel 50 mg subcu once a week with 7 injections so far she states she has improved at least 70 to 80%    Is coming in 2 months early for paperwork needed to be done for FMLA which we have discussed sending this to the forms department.    She has not had any side effects to the Enbrel injection.    She is now seeing orthopedic surgeon and did get Synvisc injections August 2024 a few weeks ago with some discomfort but hoping it gives her some relief    Stop the phentermine for weight loss at this time    Overall she feels she is headed in the right direction denies any infections or other concerns    No shortness of breath chest pain    History/Other:      Past Medical History:  Past Medical History:    Arthritis    Obesity        Past Surgical History: History reviewed. No pertinent surgical history.    Social History:  reports that she has never smoked. She has never been exposed to tobacco smoke. She has never used smokeless tobacco. She reports that she does not currently use alcohol. She reports that she does not use drugs.    Family History:   Family History   Problem Relation Age of Onset    Cancer Father     Cancer Mother     Obesity Brother     Diabetes Brother        Allergies: No Known Allergies    Current Medications:  Current Outpatient Medications   Medication Sig Dispense Refill    ENBREL SURECLICK 50 MG/ML Subcutaneous Solution Auto-injector Inject 50 mg into the skin once a week.      methotrexate 2.5 MG Oral Tab Take 8 tablets (20 mg total) by mouth once a week.  32 tablet 2    folic acid 1 MG Oral Tab Take 2 tablets (2 mg total) by mouth daily. 60 tablet 2    Phentermine HCl 15 MG Oral Cap Take 1 capsule (15 mg total) by mouth every morning. (Patient not taking: Reported on 9/4/2024) 30 capsule 0      No current facility-administered medications for this visit.     (Not in a hospital admission)      Review of Systems:     Constitutional: Negative for chills, , fatigue, fever and unexpected weight change.    HENT: Negative for congestion, and mouth sores.    Eyes: Negative for photophobia, pain, redness and visual disturbance.    Respiratory: Negative for apnea, cough, chest tightness, shortness of breath, wheezing and stridor.    Cardiovascular: Negative for chest pain, palpitations and leg swelling.    Gastrointestinal: Negative for abdominal distention, abdominal pain, blood in stool, constipation, diarrhea and nausea.    Endocrine: Negative.     Genitourinary: Negative for decreased urine volume, difficulty urinating, dyspareunia, dysuria, flank pain, and frequency.    Musculoskeletal: + arthralgias, gait problem + joint swelling.    Skin: Negative for color change, pallor and rash. No raynauds or digital ulcerations no sclerodactly.    Allergic/Immunologic: Negative.    Neurological: Negative for dizziness, tremors, seizures, syncope, speech difficulty, weakness, light-headedness, numbness and headaches.    Hematological: Does not bruise/bleed easily.    Psychiatric/Behavioral: Negative for confusion, decreased concentration, hallucinations, self-injury, sleep disturbance and suicidal ideas or depression.    Objective:   Vitals:    09/04/24 0951   BP: 130/70   Pulse: 84   Resp: 16   Temp: 97.2 °F (36.2 °C)          Constitutional: is oriented to person, place, and time. Appears well-developed and well-nourished. No distress.    HEENT: Normocephalic; EOMI; no jvd; no LAD; no oral or nasal ulcers.     Eyes: Conjunctivae and EOM are normal. Pupils are equal, round, and  reactive to light.     Neck: Normal range of motion. No thyromegaly present.    Cardiovascular: RRR, no murmurs.    Lungs: Clear, Bilateral air entry, no wheezes.    Abdominal: Soft.    Musculoskeletal:         Joint Exam 09/04/2024        Right  Left   Wrist  Swollen Tender      CMC  Swollen       MCP 2  Swollen Tender  Swollen    MCP 3  Swollen Tender  Swollen         Swollen: 6      Tender: 3          Right shoulder: Exhibits normal range of motion on abduction and internal rotation, no tenderness, no bony tenderness, no deformity, no laceration, no pain and no spasm.        Left shoulder: Exhibits normal range of motion on abduction and internal and external rotation.  no tenderness, no bony tenderness, no swelling, no effusion, no deformity, no pain, no spasm and normal strength.        Right elbow:  Exhibits normal range of motion, no swelling, no effusion and no deformity. No tenderness found. No medial epicondyle, no lateral epicondyle and no olecranon process tenderness noted. There are no contractures or tophi or nodules.        Left elbow:  Normal range of motion, no swelling, no effusion and no deformity. No medial epicondyle, no lateral epicondyle and no olecranon process tenderness noted. There are no contractures or tophi or nodules.        Right wrist:  Exhibits normal range of motion, no tenderness, no bony tenderness, no swelling, no effusion and no crepitus. Flexion and extension intact w/o limitation.        Left wrist: Exhibits normal range of motion, no tenderness, no bony tenderness, no swelling, no effusion, no crepitus and no deformity. Flexion and extension intact without limitation.        Right hip: Exhibits normal range of motion, normal strength, no tenderness, no bony tenderness, no swelling and no crepitus.        Right hand: No synovitis of DIP joints; no Bouchards or Heberden nodules noted;  strength: 100%.mild squaring of 1st cmc joint         Left hand: No synovitis of  or DIP  joints; no Bouchards or Heberden nodules noted;  strength: 100%. Mild squaring of 1st cmc joint         Left hip: Exhibits normal range of motion, normal strength, no tenderness, no bony tenderness, No swelling and no crepitus.        Right knee: Exhibits normal range of motion, no swelling, no effusion, no ecchymosis, no deformity and no erythema. No tenderness found. No medial joint line, no lateral joint line, no MCL and no LCL tenderness noted. mild crepitation on flexion of knee and extension normal.        Left knee:  Exhibits normal range of motion, no swelling, no effusion, no ecchymosis and no erythema. No tenderness found. No medial joint line, no lateral joint line and no patellar tendon tenderness noted. mild crepitation on flexion of the knee. Extension intact and normal.        Right ankle: No swelling, no deformity. No tenderness. Dorsiflexion and plantar flexion intact without limitation in range of motion.        Left ankle: Exhibits no swelling. No tenderness. No lateral malleolus and no medial malleolus tenderness found. Achilles tendon normal. Achilles tendon exhibits no pain, no defect and normal Miles's test results.  Dorsiflexion and plantar flexion intact without limitation in range of motion.        Cervical back: Exhibits normal range of motion, no tenderness, no bony tenderness, no swelling, no pain and + spasm.        Thoracic back: Exhibits normal range of motion, no tenderness, no bony tenderness and + spasm.        Lumbar back:  Exhibits normal range of motion, no tenderness, no bony tenderness, no pain and + spasm.        Right foot: normal. There is normal range of motion, no tenderness, no bony tenderness, no crepitus and no laceration. There is + synovitis no  tenderness of the MTP joints to palpation.        Left foot: normal. There is normal range of motion, no tenderness, no bony tenderness and no crepitus. There is + synovitis no  tenderness of the MTP joints to  palpation.    Lymphadenopathy: No submental, no submandibular, and no occipital adenopathy present, has no cervical adenopathy or axillary lympadenopathy.    Neurological: Alert and oriented. No focal motor or sensory abnormalities. Strength is 5/5 Upper Extremities/Lower Extremities proximally and distally.    Skin: Skin is warm, dry and intact.  No rashes no purpuric petechial lesions    Psychiatric: Normal behavior.    Results:    Labs:      Lab Results   Component Value Date    WBC 10.6 07/03/2024    RBC 4.73 07/03/2024    HGB 13.7 07/03/2024    HCT 41.9 07/03/2024    MCV 88.6 07/03/2024    MCH 29.0 07/03/2024    MCHC 32.7 07/03/2024    RDW 13.5 07/03/2024    .0 07/03/2024       No components found for: \"RELY\", \"NMET\", \"MYEL\", \"PROMY\", \"FLORENTINO\", \"ABSNEUTS\", \"ABSBANDS\", \"ABMM\", \"ABMY\", \"ABPM\", \"ABBL\"      Lab Results   Component Value Date     07/03/2024    K 4.1 07/03/2024    CO2 24.0 07/03/2024    BUN 11 07/03/2024    ALB 3.6 07/03/2024    ALB 3.76 07/03/2024    AST 11 (L) 07/03/2024    ALT 23 07/03/2024          No components found for: \"ESRWESTERGRN\"       Lab Results   Component Value Date    CRP 3.88 (H) 07/03/2024         Lab Results   Component Value Date    CLARITY Clear 07/03/2024    UROBILINOGEN Normal 07/03/2024     @LABRCNTIP(RF,B12)@      [unfilled]    Imaging:  XR CHEST PA + LAT CHEST (CPT=71046)    Result Date: 7/3/2024  CONCLUSION:  No acute pulmonary findings.   LOCATION:  [facility]   Dictated by (CST): Corey Mccarthy MD on 7/03/2024 at 2:14 PM     Finalized by (CST): Corey Mccarthy MD on 7/03/2024 at 2:14 PM       XR FOOT, COMPLETE (MIN 3 VIEWS), RIGHT (CPT=73630)    Result Date: 7/3/2024  CONCLUSION:  No acute findings.   LOCATION:  [facility]   Dictated by (CST): Corey Mccarthy MD on 7/03/2024 at 2:14 PM     Finalized by (CST): Corey Mccarthy MD on 7/03/2024 at 2:14 PM       XR HAND (MIN 3 VIEWS), LEFT (CPT=73130)    Result Date: 7/3/2024  CONCLUSION:  No acute findings.    LOCATION:  [facility]   Dictated by (CST): Corey Mccarthy MD on 2024 at 2:13 PM     Finalized by (CST): Corey Mccarthy MD on 2024 at 2:14 PM       XR HAND (MIN 3 VIEWS), RIGHT (CPT=73130)    Result Date: 7/3/2024  CONCLUSION:  No acute findings.   LOCATION:  [facility]   Dictated by (CST): Corey Mccarthy MD on 2024 at 2:13 PM     Finalized by (CST): Corey Mccarthy MD on 2024 at 2:13 PM       XR FOOT, COMPLETE (MIN 3 VIEWS), LEFT (CPT=73630)    Result Date: 7/3/2024  CONCLUSION:  No acute osseous findings..   LOCATION:  [facility]   Dictated by (CST): Corey Mccarthy MD on 2024 at 2:12 PM     Finalized by (CST): Corey Mccarthy MD on 2024 at 2:12 PM        This result has an attachment that is not available.  IMAGIN views Bilateral knee(s) with [place] and Lateral with weightbearing AP,  PA.  Medial compartment with L>R joint space narrowing subchondral sclerosis  subchondral cysts , Lateral compartment with  joint space narrowing  subchondral sclerosis . There are  periarticular osteophytes.    Patellofemoral compartment with  joint space narrowing  retropatellar  osteophytes.  Varus alignment    IMPRESSION:  Severe Bilateral knee Degenerative changes  Exam End: 24 10:01 AM Last Resulted: 24  5:49 PM   Received From: [facility]  Result Received: 24 10:31 AM       EXAM: PA, AP, Oblique, and Lateral views of the hand and wrist  bilaterally    DATE: 2019 10:50 AM    CLINICAL INDICATION: hand stiffness and FH or RA    COMPARISON: None.    FINDINGS:    Bones: There is no acute fracture. There is preserved, symmetrical  bone density.    Joints: Joint spaces are preserved without degenerative/proliferative  changes or bony erosions.    Soft Tissues: There is no focal soft tissue swelling. There are no  periarticular soft tissue calcifications or chondrocalcinosis.    IMPRESSION:  Normal views of the hands and wrists bilaterally without findings  of  inflammatory or erosive arthropathy.             Assessment & Plan:      48-year-old woman comes in for further evaluation for seropositive rheumatoid arthritis and osteoarthritis    Seropositive rheumatoid arthritis positive CCP and RF  Osteoarthritis multiple joint  Encounter for drug monitoring  Chronic prednisone on and off  Severe osteoarthritis of the knees followed by Ortho    Patient mild synovitis and active disease  She is only 7 weeks into Enbrel injections  At least 7080% improvement  Suspect she will do better in another few months  She declined steroids  X-rays of the hands and feet normal from July 2024  Chest x-ray normal from July 2024  Hepatitis and TB testing normal  Patient failed Humira before on methotrexate 20 mg once a week folic acid 1 mg daily  Continue Enbrel 50 mg subcu  Pending labs for methotrexate also given to patient after baseline testing today to be done every 2 to 3 months  Standing labs placed in to be done every 2 to 3 months  Labs reviewed from July 2024 normal  Courage weight loss exercise quad strengthening    She is responding to steroid injection is going for Synvisc injections with Ortho July 2024.  She is not ready for knee replacement yet.    Potential risks and side effects of Biologics discussed in detail    Okay to proceed with FMLA paperwork will send to forms department when she sends us the forms    Education and counseling provided to patient.  Instructed patient to call my office or seek medical attention immediately if symptoms worsen. Risks and side effects of medications and diagnosis discussed in detail and patient was given written information on new prescribed medications.    Return to clinic:  Return in about 2 months (around 11/4/2024).    Christie Thomas MD  7/3/2024

## 2024-09-09 NOTE — TELEPHONE ENCOUNTER
Family Medical Leave Act forms received in forms dept. Logged for processing. Valid Release of Information attached.

## 2024-09-17 ENCOUNTER — TELEPHONE (OUTPATIENT)
Dept: INTERNAL MEDICINE CLINIC | Facility: CLINIC | Age: 49
End: 2024-09-17

## 2024-09-17 DIAGNOSIS — Z13.0 SCREENING FOR BLOOD DISEASE: ICD-10-CM

## 2024-09-17 DIAGNOSIS — Z13.29 SCREENING FOR THYROID DISORDER: ICD-10-CM

## 2024-09-17 DIAGNOSIS — Z13.220 SCREENING FOR LIPOID DISORDERS: ICD-10-CM

## 2024-09-17 DIAGNOSIS — Z13.29 SCREENING FOR ENDOCRINE, METABOLIC AND IMMUNITY DISORDER: ICD-10-CM

## 2024-09-17 DIAGNOSIS — Z00.00 ROUTINE GENERAL MEDICAL EXAMINATION AT A HEALTH CARE FACILITY: Primary | ICD-10-CM

## 2024-09-17 DIAGNOSIS — Z13.0 SCREENING FOR ENDOCRINE, METABOLIC AND IMMUNITY DISORDER: ICD-10-CM

## 2024-09-17 DIAGNOSIS — Z13.228 SCREENING FOR ENDOCRINE, METABOLIC AND IMMUNITY DISORDER: ICD-10-CM

## 2024-09-17 NOTE — TELEPHONE ENCOUNTER
Future Appointments   Date Time Provider Department Center   10/10/2024  9:00 AM Arnulfo Oneill MD [facility] [address] [facility]   11/6/2024 12:40 PM Christie Thomas MD [facility] [address]   11/25/2024  9:40 AM Shakila Haro APRN [facility]     Labs placed per protocol.

## 2024-09-23 NOTE — TELEPHONE ENCOUNTER
2nd attempt to reach patient to obtain details. Left message to call back. Please obtain details and task provider. Forms placed in hold folder.

## 2024-09-24 ENCOUNTER — TELEPHONE (OUTPATIENT)
Dept: RHEUMATOLOGY | Facility: CLINIC | Age: 49
End: 2024-09-24

## 2024-09-24 RX ORDER — PREDNISONE 5 MG/1
10 TABLET ORAL DAILY
Qty: 30 TABLET | Refills: 0 | Status: SHIPPED | OUTPATIENT
Start: 2024-09-24

## 2024-09-24 NOTE — TELEPHONE ENCOUNTER
Patient is sending in a AIRTAME message regarding her FMLA that is due in 7 days. Please see her message below:    Good afternoon, I have been trying to reach the forms dept and no one answers the phone. My FMLA expires in 7 days. The forms dept has had all my information with the forms since Sept 4th. I never had an issue with my previous Rheumatologist as she filled them out herself. If you can please find out what's going on with my paperwork. Thank you.     Can you please reach out to patient and give her an update? Thank you so much!

## 2024-09-24 NOTE — TELEPHONE ENCOUNTER
Called patient to obtain details on pending Family Medical Leave Act forms.    Type of Leave:  Intermittent FMLA  Reason for Leave: RA  Start date of leave: 10/1/24  End date of leave:10/1/25  How many flare ups per month/length?: 1-2 flare ups per month, each episode lasting 1-4 days   How many appts per month/length?:   Was Fee and Turnaround info Given?: yes    Fax and upload to [employer]

## 2024-09-24 NOTE — TELEPHONE ENCOUNTER
Unable to Left message when calling back - mailbox full. Per notes below, forms are on hold due to patient not answering to get details.

## 2024-09-24 NOTE — TELEPHONE ENCOUNTER
Dr. Thomas,    Patient is requesting intermittent Family Medical Leave Act due to recurrent rheumatoid arthritis flare ups. Patient is requesting 1-2 flare ups per month, each episodes lasting 1-4 days. Do you support?    Thank you,  Stefanie VALLES

## 2024-09-26 NOTE — TELEPHONE ENCOUNTER
Dr. Thomas,    Please sign off on form if you agree to: Intermittent Family Medical Leave Act due to rheumatoid arthritis, 1-2 flare ups per month, each episode lasting 1-2 days     -Signature page will be the first page scanned  -From your Inbasket, Highlight the patient and click Chart   -Double click the 9/4/24 Forms Completion telephone encounter  -Scroll down to the Media section   -Click the blue Hyperlink: RICHARD Thomas 9/26/24  -Click Acknowledge located in the top right ribbon/menu   -Drag the mouse into the blank space of the document and a + sign will appear. Left click to   electronically sign the document.  -Once signed, simply exit out of the screen and you signature will be saved.     Thank you,    Stefanie VALLES

## 2024-09-27 DIAGNOSIS — M05.719 RHEUMATOID ARTHRITIS INVOLVING SHOULDER WITH POSITIVE RHEUMATOID FACTOR, UNSPECIFIED LATERALITY (HCC): Primary | ICD-10-CM

## 2024-09-27 DIAGNOSIS — M05.79 RHEUMATOID ARTHRITIS INVOLVING MULTIPLE SITES WITH POSITIVE RHEUMATOID FACTOR (HCC): ICD-10-CM

## 2024-09-27 NOTE — TELEPHONE ENCOUNTER
Family Medical Leave Act completed and faxed to [employer] [phone]. Called patient to inform of what was approved. Patient understood and had no further questions . Forms uploaded to [employer]

## 2024-09-27 NOTE — TELEPHONE ENCOUNTER
LOV: 09/04/2024    Future Appointments   Date Time Provider Department Center   10/10/2024  9:00 AM Arnulfo Oneill MD [facility]   11/6/2024 12:40 PM Christie Thomas MD [facility] [address]   11/25/2024  9:40 AM Shakila Haro APRN [facility]       LF: 07/03/2024    QTY: 60    Refills:  2    LABS:   Component      Latest Ref Rng 7/3/2024   WBC      4.0 - 11.0 x10(3) uL 10.6    RBC      3.80 - 5.30 x10(6)uL 4.73    Hemoglobin      12.0 - 16.0 g/dL 13.7    Hematocrit      35.0 - 48.0 % 41.9    Platelet Count      150.0 - 450.0 10(3)uL 255.0    MCV      80.0 - 100.0 fL 88.6    MCH      26.0 - 34.0 pg 29.0    MCHC      31.0 - 37.0 g/dL 32.7    RDW      % 13.5    Prelim Neutrophil Abs      1.50 - 7.70 x10 (3) uL 7.68    Neutrophils Absolute      1.50 - 7.70 x10(3) uL 7.68    Lymphocytes Absolute      1.00 - 4.00 x10(3) uL 2.12    Monocytes Absolute      0.10 - 1.00 x10(3) uL 0.57    Eosinophils Absolute      0.00 - 0.70 x10(3) uL 0.17    Basophils Absolute      0.00 - 0.20 x10(3) uL 0.06    Immature Granulocyte Absolute      0.00 - 1.00 x10(3) uL 0.04    Neutrophils %      % 72.1    Lymphocytes %      % 19.9    Monocytes %      % 5.4    Eosinophils %      % 1.6    Basophils %      % 0.6    Immature Granulocyte %      % 0.4    Glucose      70 - 99 mg/dL 89    Sodium      136 - 145 mmol/L 138    Potassium      3.5 - 5.1 mmol/L 4.1    Chloride      98 - 112 mmol/L 106    Carbon Dioxide, Total      21.0 - 32.0 mmol/L 24.0    ANION GAP      0 - 18 mmol/L 8    BUN      9 - 23 mg/dL 11    CREATININE      0.55 - 1.02 mg/dL 0.80    CALCIUM      8.5 - 10.1 mg/dL 9.0    CALCULATED OSMOLALITY      275 - 295 mOsm/kg 285    EGFR      >=60 mL/min/1.73m2 91    AST (SGOT)      15 - 37 U/L 11 (L)    ALT (SGPT)      13 - 56 U/L 23    ALKALINE PHOSPHATASE      39 - 100 U/L 153 (H)    Total Bilirubin      0.1 - 2.0 mg/dL 0.4    PROTEIN, TOTAL      6.4 - 8.2 g/dL 8.2    Albumin      3.4 - 5.0 g/dL 3.6     Globulin      2.8 - 4.4 g/dL 4.6 (H)    A/G Ratio      1.0 - 2.0  0.8 (L)    Patient Fasting for CMP? No    SED RATE      0 - 20 mm/Hr 61 (H)       Legend:  (L) Low  (H) High

## 2024-09-30 RX ORDER — FOLIC ACID 1 MG/1
2 TABLET ORAL DAILY
Qty: 60 TABLET | Refills: 2 | Status: SHIPPED | OUTPATIENT
Start: 2024-09-30

## 2024-10-02 ENCOUNTER — PATIENT MESSAGE (OUTPATIENT)
Dept: RHEUMATOLOGY | Facility: CLINIC | Age: 49
End: 2024-10-02

## 2024-10-02 DIAGNOSIS — M05.79 RHEUMATOID ARTHRITIS INVOLVING MULTIPLE SITES WITH POSITIVE RHEUMATOID FACTOR (HCC): Primary | ICD-10-CM

## 2024-10-14 ENCOUNTER — LAB ENCOUNTER (OUTPATIENT)
Dept: LAB | Age: 49
End: 2024-10-14
Attending: INTERNAL MEDICINE
Payer: COMMERCIAL

## 2024-10-14 DIAGNOSIS — Z13.29 SCREENING FOR ENDOCRINE, METABOLIC AND IMMUNITY DISORDER: ICD-10-CM

## 2024-10-14 DIAGNOSIS — Z13.228 SCREENING FOR ENDOCRINE, METABOLIC AND IMMUNITY DISORDER: ICD-10-CM

## 2024-10-14 DIAGNOSIS — Z13.220 LIPID SCREENING: ICD-10-CM

## 2024-10-14 DIAGNOSIS — Z13.0 SCREENING FOR BLOOD DISEASE: ICD-10-CM

## 2024-10-14 DIAGNOSIS — Z13.220 SCREENING FOR LIPOID DISORDERS: ICD-10-CM

## 2024-10-14 DIAGNOSIS — Z13.0 SCREENING FOR ENDOCRINE, METABOLIC AND IMMUNITY DISORDER: ICD-10-CM

## 2024-10-14 DIAGNOSIS — Z13.21 ENCOUNTER FOR VITAMIN DEFICIENCY SCREENING: ICD-10-CM

## 2024-10-14 DIAGNOSIS — Z00.00 ROUTINE GENERAL MEDICAL EXAMINATION AT A HEALTH CARE FACILITY: ICD-10-CM

## 2024-10-14 DIAGNOSIS — M05.719 RHEUMATOID ARTHRITIS INVOLVING SHOULDER WITH POSITIVE RHEUMATOID FACTOR, UNSPECIFIED LATERALITY (HCC): ICD-10-CM

## 2024-10-14 DIAGNOSIS — Z13.29 THYROID DISORDER SCREEN: ICD-10-CM

## 2024-10-14 DIAGNOSIS — Z13.29 SCREENING FOR THYROID DISORDER: ICD-10-CM

## 2024-10-14 LAB
ALBUMIN SERPL-MCNC: 4.3 G/DL (ref 3.2–4.8)
ALBUMIN/GLOB SERPL: 1.3 {RATIO} (ref 1–2)
ALP LIVER SERPL-CCNC: 142 U/L
ALT SERPL-CCNC: 16 U/L
ANION GAP SERPL CALC-SCNC: 5 MMOL/L (ref 0–18)
AST SERPL-CCNC: 13 U/L (ref ?–34)
BASOPHILS # BLD AUTO: 0.05 X10(3) UL (ref 0–0.2)
BASOPHILS NFR BLD AUTO: 0.5 %
BILIRUB SERPL-MCNC: 0.2 MG/DL (ref 0.3–1.2)
BUN BLD-MCNC: 12 MG/DL (ref 9–23)
CALCIUM BLD-MCNC: 9.7 MG/DL (ref 8.7–10.4)
CHLORIDE SERPL-SCNC: 106 MMOL/L (ref 98–112)
CHOLEST SERPL-MCNC: 161 MG/DL (ref ?–200)
CO2 SERPL-SCNC: 28 MMOL/L (ref 21–32)
CREAT BLD-MCNC: 0.8 MG/DL
EGFRCR SERPLBLD CKD-EPI 2021: 91 ML/MIN/1.73M2 (ref 60–?)
EOSINOPHIL # BLD AUTO: 0.04 X10(3) UL (ref 0–0.7)
EOSINOPHIL NFR BLD AUTO: 0.4 %
ERYTHROCYTE [DISTWIDTH] IN BLOOD BY AUTOMATED COUNT: 14.4 %
ERYTHROCYTE [SEDIMENTATION RATE] IN BLOOD: 45 MM/HR
FASTING PATIENT LIPID ANSWER: NO
FASTING STATUS PATIENT QL REPORTED: NO
GLOBULIN PLAS-MCNC: 3.2 G/DL (ref 2–3.5)
GLUCOSE BLD-MCNC: 93 MG/DL (ref 70–99)
HCT VFR BLD AUTO: 39.2 %
HDLC SERPL-MCNC: 47 MG/DL (ref 40–59)
HGB BLD-MCNC: 12.7 G/DL
IMM GRANULOCYTES # BLD AUTO: 0.04 X10(3) UL (ref 0–1)
IMM GRANULOCYTES NFR BLD: 0.4 %
LDLC SERPL CALC-MCNC: 95 MG/DL (ref ?–100)
LYMPHOCYTES # BLD AUTO: 1.67 X10(3) UL (ref 1–4)
LYMPHOCYTES NFR BLD AUTO: 16.5 %
MCH RBC QN AUTO: 28.6 PG (ref 26–34)
MCHC RBC AUTO-ENTMCNC: 32.4 G/DL (ref 31–37)
MCV RBC AUTO: 88.3 FL
MONOCYTES # BLD AUTO: 0.57 X10(3) UL (ref 0.1–1)
MONOCYTES NFR BLD AUTO: 5.6 %
NEUTROPHILS # BLD AUTO: 7.76 X10 (3) UL (ref 1.5–7.7)
NEUTROPHILS # BLD AUTO: 7.76 X10(3) UL (ref 1.5–7.7)
NEUTROPHILS NFR BLD AUTO: 76.6 %
NONHDLC SERPL-MCNC: 114 MG/DL (ref ?–130)
OSMOLALITY SERPL CALC.SUM OF ELEC: 287 MOSM/KG (ref 275–295)
PLATELET # BLD AUTO: 238 10(3)UL (ref 150–450)
POTASSIUM SERPL-SCNC: 4 MMOL/L (ref 3.5–5.1)
PROT SERPL-MCNC: 7.5 G/DL (ref 5.7–8.2)
RBC # BLD AUTO: 4.44 X10(6)UL
SODIUM SERPL-SCNC: 139 MMOL/L (ref 136–145)
TRIGL SERPL-MCNC: 105 MG/DL (ref 30–149)
TSI SER-ACNC: 0.91 MIU/ML (ref 0.55–4.78)
VIT B12 SERPL-MCNC: 799 PG/ML (ref 211–911)
VIT D+METAB SERPL-MCNC: 37 NG/ML (ref 30–100)
VLDLC SERPL CALC-MCNC: 17 MG/DL (ref 0–30)
WBC # BLD AUTO: 10.1 X10(3) UL (ref 4–11)

## 2024-10-14 PROCEDURE — 85025 COMPLETE CBC W/AUTO DIFF WBC: CPT

## 2024-10-14 PROCEDURE — 80061 LIPID PANEL: CPT

## 2024-10-14 PROCEDURE — 84443 ASSAY THYROID STIM HORMONE: CPT

## 2024-10-14 PROCEDURE — 80053 COMPREHEN METABOLIC PANEL: CPT

## 2024-10-14 PROCEDURE — 82306 VITAMIN D 25 HYDROXY: CPT

## 2024-10-14 PROCEDURE — 82607 VITAMIN B-12: CPT

## 2024-10-14 PROCEDURE — 85652 RBC SED RATE AUTOMATED: CPT

## 2024-10-14 PROCEDURE — 36415 COLL VENOUS BLD VENIPUNCTURE: CPT

## 2024-10-15 ENCOUNTER — NURSE ONLY (OUTPATIENT)
Dept: RHEUMATOLOGY | Facility: CLINIC | Age: 49
End: 2024-10-15
Payer: COMMERCIAL

## 2024-10-15 RX ORDER — METHOTREXATE 2.5 MG/1
20 TABLET ORAL WEEKLY
Qty: 32 TABLET | Refills: 2 | Status: SHIPPED | OUTPATIENT
Start: 2024-10-15

## 2024-10-15 RX ORDER — METHYLPREDNISOLONE 4 MG/1
TABLET ORAL
Qty: 1 EACH | Refills: 0 | Status: SHIPPED | OUTPATIENT
Start: 2024-10-15

## 2024-10-28 ENCOUNTER — NURSE ONLY (OUTPATIENT)
Dept: RHEUMATOLOGY | Facility: CLINIC | Age: 49
End: 2024-10-28
Payer: COMMERCIAL

## 2024-10-28 DIAGNOSIS — M05.79 RHEUMATOID ARTHRITIS INVOLVING MULTIPLE SITES WITH POSITIVE RHEUMATOID FACTOR (HCC): Primary | ICD-10-CM

## 2024-11-06 ENCOUNTER — OFFICE VISIT (OUTPATIENT)
Dept: RHEUMATOLOGY | Facility: CLINIC | Age: 49
End: 2024-11-06
Payer: COMMERCIAL

## 2024-11-06 ENCOUNTER — TELEPHONE (OUTPATIENT)
Dept: RHEUMATOLOGY | Facility: CLINIC | Age: 49
End: 2024-11-06

## 2024-11-06 VITALS
HEART RATE: 69 BPM | DIASTOLIC BLOOD PRESSURE: 70 MMHG | SYSTOLIC BLOOD PRESSURE: 130 MMHG | WEIGHT: 289 LBS | TEMPERATURE: 97 F | OXYGEN SATURATION: 96 % | RESPIRATION RATE: 16 BRPM | BODY MASS INDEX: 48.15 KG/M2 | HEIGHT: 65 IN

## 2024-11-06 DIAGNOSIS — E66.01 OBESITY, MORBID, BMI 40.0-49.9 (HCC): Primary | ICD-10-CM

## 2024-11-06 DIAGNOSIS — Z51.81 THERAPEUTIC DRUG MONITORING: ICD-10-CM

## 2024-11-06 DIAGNOSIS — M05.711 RHEUMATOID ARTHRITIS INVOLVING RIGHT SHOULDER WITH POSITIVE RHEUMATOID FACTOR (HCC): ICD-10-CM

## 2024-11-06 DIAGNOSIS — M15.0 PRIMARY OSTEOARTHRITIS INVOLVING MULTIPLE JOINTS: ICD-10-CM

## 2024-11-06 PROCEDURE — 3075F SYST BP GE 130 - 139MM HG: CPT | Performed by: INTERNAL MEDICINE

## 2024-11-06 PROCEDURE — 3008F BODY MASS INDEX DOCD: CPT | Performed by: INTERNAL MEDICINE

## 2024-11-06 PROCEDURE — 3078F DIAST BP <80 MM HG: CPT | Performed by: INTERNAL MEDICINE

## 2024-11-06 PROCEDURE — 99214 OFFICE O/P EST MOD 30 MIN: CPT | Performed by: INTERNAL MEDICINE

## 2024-11-06 RX ORDER — METHYLPREDNISOLONE 4 MG/1
TABLET ORAL
Qty: 1 EACH | Refills: 0 | Status: SHIPPED | OUTPATIENT
Start: 2024-11-06

## 2024-11-06 RX ORDER — METHYLPREDNISOLONE 4 MG/1
TABLET ORAL
Qty: 1 EACH | Refills: 0 | Status: SHIPPED | OUTPATIENT
Start: 2024-11-06 | End: 2024-11-06

## 2024-11-06 RX ORDER — MEDROXYPROGESTERONE ACETATE 150 MG/ML
50 INJECTION, SUSPENSION INTRAMUSCULAR WEEKLY
Qty: 4.2 ML | Refills: 5 | Status: SHIPPED | OUTPATIENT
Start: 2024-11-06

## 2024-11-06 NOTE — TELEPHONE ENCOUNTER
Do we know why patients cost is high?    Is there any way can make more cost effective    If we swtich to something else will it make a difference    Do we have any more samples    ? 10$ card?    She states she wants us to try to send it to [facility] pharmacy    So will try sending it today

## 2024-11-06 NOTE — TELEPHONE ENCOUNTER
Spoke to pt, explained that we are working on Orencia IV at [facility]. Might be cheaper going through medical vs pharmacy benefit. Pt will  sample of Enbrel in the meantime while working on auth. Pt aware not to take Enbrel the week she starts the orencia.     Pt requesting we send Medrol dose pack to [facility]. It was sent to [facility] and out of pocket $$

## 2024-11-06 NOTE — PROGRESS NOTES
[facility], [address] [place] [address]      Consult     Yvonne Suarez Patient Status:  No patient class for patient encounter    1975 MRN [id number]   Location [address] Attending No att. providers found   Hosp Day # 0 PCP Arnulfo Oneill MD     Referring Provider: PCP    Reason for Consultation:   Component  Ref Range & Units 19 10:26 AM   ANTI-CCP, IGG  <5.0 U/.2 High    Comment: Approximately 70% of patients with clinically confirmed RA (based on ACR  criteria) test positive (>4.9 U/mL) by this method. Negative results are  observed in virtually 100% of asymptomatic, apparently healthy males     Component  Ref Range & Units 19 10:26 AM   RHEUMATOID FACTOR  <20 IU/ High      Subjective:    Yvonne Suarez is a 49 year old female with diagnosed with seropositive RA diagnosed initially based on swelling of knees and osteoarthritis and then hand swelling and pain.diagnosed around .    Prednisone first and methotrexate 20mg once week, folic 2mg daily     Was on Humira at some point was a cost issues (around till about 6 months) Only on 3 months.     No enbrel. On plaquenil didn't help much. NO other biologicals    Stiffness and swelling on hands wrists; some pain and swelling possible discomfort in feet.     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     Denies any history of DVT PE TIA CVA seizures migraines or headaches    States no shortness of breath ,chest pain ,fevers ,chills    States no urinary or bowel symptoms; bloody  stools    Her weight has been stable (trying to loose) now she is off (phentermaine March 2024)     Denies any depression anxiety or insomnia  ( 3 kids 28, 26 and 22) was in 20s )(some nonrestorastlve sleep    Patient was seen as a new patient July 2024    Diagnosis of seropositive rheumatoid arthritis    X-rays of the hands and feet normal chest x-ray normal in July 2024    Hepatitis blood work TB testing normal    Patient was started on Enbrel 50 mg subcu once a week with 7 injections so far she states she has improved at least 70 to 80%    She was seen again September 2024    She has been doing remarkably better mild stiffness in the hands    Pain concern is insurance not covering a big portion of the Enbrel    She is willing to switch to Orencia infusions if this is covered better because she has a high deductible.  She would like us to send a prescription to [facility] as well to see if this route would also work    Get completed [facility] paperwork for rheumatoid arthritis in between    She has not had any side effects to the Enbrel injection.    She is now seeing orthopedic surgeon and did get Synvisc injections August 2024 a few weeks ago with some discomfort but hoping it gives her some relief    Stop the phentermine for weight loss at this time    Overall she feels she is headed in the right direction denies any infections or other concerns    No shortness of breath chest pain    She has no concerns otherwise    Open to Medrol dose pack at this time    History/Other:      Past Medical History:  Past Medical History:    Arthritis    Obesity        Past Surgical History: History reviewed. No pertinent surgical history.    Social History:  reports that she has never smoked. She has never been exposed to tobacco smoke. She has never used smokeless tobacco. She reports that she does not currently use alcohol. She reports that she does not use drugs.    Family History:   Family History   Problem Relation Age of Onset     Cancer Father     Cancer Mother     Obesity Brother     Diabetes Brother        Allergies: No Known Allergies    Current Medications:  Current Outpatient Medications   Medication Sig Dispense Refill    ENBREL SURECLICK 50 MG/ML Subcutaneous Solution Auto-injector Inject 50 mg into the skin once a week. 4.2 mL 5    methylPREDNISolone (MEDROL) 4 MG Oral Tablet Therapy Pack As directed. 1 each 0    methotrexate 2.5 MG Oral Tab Take 8 tablets (20 mg total) by mouth once a week. 32 tablet 2    folic acid 1 MG Oral Tab Take 2 tablets (2 mg total) by mouth daily. 60 tablet 2      No current facility-administered medications for this visit.     (Not in a hospital admission)      Review of Systems:     Constitutional: Negative for chills, , fatigue, fever and unexpected weight change.    HENT: Negative for congestion, and mouth sores.    Eyes: Negative for photophobia, pain, redness and visual disturbance.    Respiratory: Negative for apnea, cough, chest tightness, shortness of breath, wheezing and stridor.    Cardiovascular: Negative for chest pain, palpitations and leg swelling.    Gastrointestinal: Negative for abdominal distention, abdominal pain, blood in stool, constipation, diarrhea and nausea.    Endocrine: Negative.     Genitourinary: Negative for decreased urine volume, difficulty urinating, dyspareunia, dysuria, flank pain, and frequency.    Musculoskeletal: + arthralgias, gait problem + joint swelling.    Skin: Negative for color change, pallor and rash. No raynauds or digital ulcerations no sclerodactly.    Allergic/Immunologic: Negative.    Neurological: Negative for dizziness, tremors, seizures, syncope, speech difficulty, weakness, light-headedness, numbness and headaches.    Hematological: Does not bruise/bleed easily.    Psychiatric/Behavioral: Negative for confusion, decreased concentration, hallucinations, self-injury, sleep disturbance and suicidal ideas or depression.    Objective:   Vitals:    11/06/24  1226   BP: 130/70   Pulse: 69   Resp: 16   Temp: 97.3 °F (36.3 °C)          Constitutional: is oriented to person, place, and time. Appears well-developed and well-nourished. No distress.    HEENT: Normocephalic; EOMI; no jvd; no LAD; no oral or nasal ulcers.     Eyes: Conjunctivae and EOM are normal. Pupils are equal, round, and reactive to light.     Neck: Normal range of motion. No thyromegaly present.    Cardiovascular: RRR, no murmurs.    Lungs: Clear, Bilateral air entry, no wheezes.    Abdominal: Soft.    Musculoskeletal:         Joint Exam 11/06/2024        Right  Left   MCP 2  Swollen Tender  Swollen Tender   MCP 3  Swollen Tender  Swollen Tender        Swollen: 4      Tender: 4          Right shoulder: Exhibits normal range of motion on abduction and internal rotation, no tenderness, no bony tenderness, no deformity, no laceration, no pain and no spasm.        Left shoulder: Exhibits normal range of motion on abduction and internal and external rotation.  no tenderness, no bony tenderness, no swelling, no effusion, no deformity, no pain, no spasm and normal strength.        Right elbow:  Exhibits normal range of motion, no swelling, no effusion and no deformity. No tenderness found. No medial epicondyle, no lateral epicondyle and no olecranon process tenderness noted. There are no contractures or tophi or nodules.        Left elbow:  Normal range of motion, no swelling, no effusion and no deformity. No medial epicondyle, no lateral epicondyle and no olecranon process tenderness noted. There are no contractures or tophi or nodules.        Right wrist:  Exhibits normal range of motion, no tenderness, no bony tenderness, no swelling, no effusion and no crepitus. Flexion and extension intact w/o limitation.        Left wrist: Exhibits normal range of motion, no tenderness, no bony tenderness, no swelling, no effusion, no crepitus and no deformity. Flexion and extension intact without limitation.        Right  hip: Exhibits normal range of motion, normal strength, no tenderness, no bony tenderness, no swelling and no crepitus.        Right hand: No synovitis of DIP joints; no Bouchards or Heberden nodules noted;  strength: 100%.mild squaring of 1st cmc joint         Left hand: No synovitis of  or DIP joints; no Bouchards or Heberden nodules noted;  strength: 100%. Mild squaring of 1st cmc joint         Left hip: Exhibits normal range of motion, normal strength, no tenderness, no bony tenderness, No swelling and no crepitus.        Right knee: Exhibits normal range of motion, no swelling, no effusion, no ecchymosis, no deformity and no erythema. No tenderness found. No medial joint line, no lateral joint line, no MCL and no LCL tenderness noted. mild crepitation on flexion of knee and extension normal.        Left knee:  Exhibits normal range of motion, no swelling, no effusion, no ecchymosis and no erythema. No tenderness found. No medial joint line, no lateral joint line and no patellar tendon tenderness noted. mild crepitation on flexion of the knee. Extension intact and normal.        Right ankle: No swelling, no deformity. No tenderness. Dorsiflexion and plantar flexion intact without limitation in range of motion.        Left ankle: Exhibits no swelling. No tenderness. No lateral malleolus and no medial malleolus tenderness found. Achilles tendon normal. Achilles tendon exhibits no pain, no defect and normal Miles's test results.  Dorsiflexion and plantar flexion intact without limitation in range of motion.        Cervical back: Exhibits normal range of motion, no tenderness, no bony tenderness, no swelling, no pain and + spasm.        Thoracic back: Exhibits normal range of motion, no tenderness, no bony tenderness and + spasm.        Lumbar back:  Exhibits normal range of motion, no tenderness, no bony tenderness, no pain and + spasm.        Right foot: normal. There is normal range of motion, no  tenderness, no bony tenderness, no crepitus and no laceration. There is + synovitis no  tenderness of the MTP joints to palpation.        Left foot: normal. There is normal range of motion, no tenderness, no bony tenderness and no crepitus. There is + synovitis no  tenderness of the MTP joints to palpation.    Lymphadenopathy: No submental, no submandibular, and no occipital adenopathy present, has no cervical adenopathy or axillary lympadenopathy.    Neurological: Alert and oriented. No focal motor or sensory abnormalities. Strength is 5/5 Upper Extremities/Lower Extremities proximally and distally.    Skin: Skin is warm, dry and intact.  No rashes no purpuric petechial lesions    Psychiatric: Normal behavior.    Results:    Labs:      Lab Results   Component Value Date    WBC 10.1 10/14/2024    RBC 4.44 10/14/2024    HGB 12.7 10/14/2024    HCT 39.2 10/14/2024    MCV 88.3 10/14/2024    MCH 28.6 10/14/2024    MCHC 32.4 10/14/2024    RDW 14.4 10/14/2024    .0 10/14/2024       No components found for: \"RELY\", \"NMET\", \"MYEL\", \"PROMY\", \"FLORENTINO\", \"ABSNEUTS\", \"ABSBANDS\", \"ABMM\", \"ABMY\", \"ABPM\", \"ABBL\"      Lab Results   Component Value Date     10/14/2024    K 4.0 10/14/2024    CO2 28.0 10/14/2024    BUN 12 10/14/2024    ALB 4.3 10/14/2024    AST 13 10/14/2024    ALT 16 10/14/2024          No components found for: \"ESRWESTERGRN\"       Lab Results   Component Value Date    CRP 3.88 (H) 2024         Lab Results   Component Value Date    CLARITY Clear 2024    UROBILINOGEN Normal 2024     @LABRCNTIP(RF,B12)@      [unfilled]    Imaging:  No results found.    This result has an attachment that is not available.  IMAGIN views Bilateral knee(s) with Angie and Lateral with weightbearing AP,  PA.  Medial compartment with L>R joint space narrowing subchondral sclerosis  subchondral cysts , Lateral compartment with  joint space narrowing  subchondral sclerosis . There are  periarticular  osteophytes.    Patellofemoral compartment with  joint space narrowing  retropatellar  osteophytes.  Varus alignment    IMPRESSION:  Severe Bilateral knee Degenerative changes  Exam End: 03/13/24 10:01 AM Last Resulted: 03/13/24  5:49 PM   Received From: [facility]  Result Received: 07/02/24 10:31 AM       EXAM: PA, AP, Oblique, and Lateral views of the hand and wrist  bilaterally    DATE: 8/9/2019 10:50 AM    CLINICAL INDICATION: hand stiffness and FH or RA    COMPARISON: None.    FINDINGS:    Bones: There is no acute fracture. There is preserved, symmetrical  bone density.    Joints: Joint spaces are preserved without degenerative/proliferative  changes or bony erosions.    Soft Tissues: There is no focal soft tissue swelling. There are no  periarticular soft tissue calcifications or chondrocalcinosis.    IMPRESSION:  Normal views of the hands and wrists bilaterally without findings of  inflammatory or erosive arthropathy.             Assessment & Plan:      49-year-old woman comes in for further evaluation for seropositive rheumatoid arthritis and osteoarthritis    Seropositive rheumatoid arthritis positive CCP and RF  Osteoarthritis multiple joint  Encounter for drug monitoring  Chronic prednisone on and off  Severe osteoarthritis of the knees followed by Ortho    Patient mild synovitis and active disease  She is only 7 weeks into Enbrel injections  At least 70-80% improvement  Patient is having issues with insurance is open to switching to Orencia infusions  She also would like us to try sending the Enbrel to [employer] to see if it would be cheaper  Is open to a Medrol Dosepak  Suspect she will do better in another few months  X-rays of the hands and feet normal from July 2024  Chest x-ray normal from July 2024  Hepatitis and TB testing normal  Patient failed Humira before on methotrexate 20 mg once a week folic acid 1 mg daily and now Enbrel 50 mg subcu  Recent labs for methotrexate stable including CBC  CMP and ESR slight elevation of ESR    Standing labs placed in to be done every 2 to 3 months    encouraged weight loss exercise quad strengthening    She is responding to steroid injection is going for Synvisc injections with Ortho July 2024.  She is not ready for knee replacement yet.    Potential risks and side effects of Biologics discussed in detail    [place] paperwork completed    Education and counseling provided to patient.  Instructed patient to call my office or seek medical attention immediately if symptoms worsen. Risks and side effects of medications and diagnosis discussed in detail and patient was given written information on new prescribed medications.    Return to clinic:  Return in about 4 months (around 3/6/2025).    Christie Thomas MD  7/3/2024

## 2024-11-06 NOTE — TELEPHONE ENCOUNTER
Order for Orencia 1000mg at [facility] to [facility]

## 2024-11-11 ENCOUNTER — NURSE ONLY (OUTPATIENT)
Dept: RHEUMATOLOGY | Facility: CLINIC | Age: 49
End: 2024-11-11
Payer: COMMERCIAL

## 2024-11-11 DIAGNOSIS — M05.711 RHEUMATOID ARTHRITIS INVOLVING RIGHT SHOULDER WITH POSITIVE RHEUMATOID FACTOR (HCC): Primary | ICD-10-CM

## 2024-11-15 ENCOUNTER — TELEPHONE (OUTPATIENT)
Dept: RHEUMATOLOGY | Facility: CLINIC | Age: 49
End: 2024-11-15

## 2024-11-15 NOTE — TELEPHONE ENCOUNTER
Additional Information Required  Prior authorization in place - if requesting an increased quantity, a quantity limit review can be submitted via fax

## 2024-11-22 ENCOUNTER — PATIENT MESSAGE (OUTPATIENT)
Dept: RHEUMATOLOGY | Facility: CLINIC | Age: 49
End: 2024-11-22

## 2024-11-22 NOTE — TELEPHONE ENCOUNTER
Phoned [facility] to check status of Orencia infusion. They have not received our orders. Faxed to [facility] for approval.

## 2024-11-25 ENCOUNTER — PATIENT MESSAGE (OUTPATIENT)
Dept: RHEUMATOLOGY | Facility: CLINIC | Age: 49
End: 2024-11-25

## 2024-11-25 ENCOUNTER — OFFICE VISIT (OUTPATIENT)
Dept: INTERNAL MEDICINE CLINIC | Facility: CLINIC | Age: 49
End: 2024-11-25
Payer: COMMERCIAL

## 2024-11-25 VITALS
RESPIRATION RATE: 16 BRPM | DIASTOLIC BLOOD PRESSURE: 74 MMHG | SYSTOLIC BLOOD PRESSURE: 118 MMHG | HEART RATE: 66 BPM | WEIGHT: 285 LBS | BODY MASS INDEX: 47.48 KG/M2 | HEIGHT: 65 IN

## 2024-11-25 DIAGNOSIS — Z51.81 THERAPEUTIC DRUG MONITORING: Primary | ICD-10-CM

## 2024-11-25 DIAGNOSIS — M05.9 RHEUMATOID ARTHRITIS WITH POSITIVE RHEUMATOID FACTOR, INVOLVING UNSPECIFIED SITE (HCC): ICD-10-CM

## 2024-11-25 DIAGNOSIS — K76.0 FATTY LIVER: ICD-10-CM

## 2024-11-25 DIAGNOSIS — E66.01 OBESITY, MORBID, BMI 40.0-49.9 (HCC): ICD-10-CM

## 2024-11-25 PROCEDURE — 3074F SYST BP LT 130 MM HG: CPT | Performed by: NURSE PRACTITIONER

## 2024-11-25 PROCEDURE — 99214 OFFICE O/P EST MOD 30 MIN: CPT | Performed by: NURSE PRACTITIONER

## 2024-11-25 PROCEDURE — 3008F BODY MASS INDEX DOCD: CPT | Performed by: NURSE PRACTITIONER

## 2024-11-25 PROCEDURE — 3078F DIAST BP <80 MM HG: CPT | Performed by: NURSE PRACTITIONER

## 2024-11-25 RX ORDER — PHENTERMINE HYDROCHLORIDE 15 MG/1
15 CAPSULE ORAL EVERY MORNING
Qty: 90 CAPSULE | Refills: 0 | Status: CANCELLED | OUTPATIENT
Start: 2024-11-25

## 2024-11-25 RX ORDER — PHENTERMINE HYDROCHLORIDE 30 MG/1
30 CAPSULE ORAL EVERY MORNING
Qty: 30 CAPSULE | Refills: 3 | Status: SHIPPED | OUTPATIENT
Start: 2024-11-25

## 2024-11-25 NOTE — PROGRESS NOTES
HISTORY OF PRESENT ILLNESS  Chief Complaint   Patient presents with    Weight Check     Down 6     Yvonne Suarez is a 49 year old female here for follow up with medical weight loss program for the treatment of overweight, obesity, or morbid obesity.     Down 6 lbs (first month f/u from 3/2024)   Compliant on phentermine 15mg (ran out of medications about 2-3 months ago)   Tolerating well, helping with decreasing appetite and no side effects     Got down to #278 lbs on home scale (in July 2024) and got sick, and ran out of medications and started to regain weight   Knee pain was improving with weight loss   +stress eating, mother is sick  Exercise/Activity: 2x/ week, via walking, not doing anything routine as far as exercise  Nutrition: eating regular meals, +protein, minimal veggies. +interm. tracking reports  Meals out per week on average: 1  Stress is manageable   Sleep: 6-8 hours/night, waking up feeling rested    Denies chest pain, shortness of breath, dizziness, blurred vision, headache, paresthesia, nausea/vomiting.     Breakfast Lunch Dinner Snacks Fluids   Reviewed              Wt Readings from Last 6 Encounters:   11/25/24 285 lb (129.3 kg)   11/06/24 289 lb (131.1 kg)   09/04/24 286 lb (129.7 kg)   07/03/24 282 lb (127.9 kg)   03/19/24 291 lb (132 kg)   03/15/24 296 lb 8 oz (134.5 kg)          REVIEW OF SYSTEMS  GENERAL: feels well otherwise, denied any fevers chills or night sweats   LUNGS: denies shortness of breath  CARDIOVASCULAR: denies chest pain  GI: denies abdominal pain  MUSCULOSKELETAL: denies back pain, +joint pains (RA, bilat. Knees L>R)   PSYCH: denies change in behavior or mood, denies feeling sad or depressed    EXAM  /74   Pulse 66   Resp 16   Ht 5' 5\" (1.651 m)   Wt 285 lb (129.3 kg)   LMP  (LMP Unknown)   BMI 47.43 kg/m²       GENERAL: well developed, well nourished, in no apparent distress, A/O x3  SKIN: no rashes, no suspicious lesions  HEENT: atraumatic, normocephalic,  OP-clear, PERRLA  NECK: supple, no adenopathy  LUNGS: CTA in all fields, breathing non labored  CARDIO: RRR without murmur  GI: +BS, NT/ND, no masses or HSM  EXTREMITIES: no cyanosis, no clubbing, no edema    Lab Results   Component Value Date    GLU 93 10/14/2024    BUN 12 10/14/2024    CREATSERUM 0.80 10/14/2024    ANIONGAP 5 10/14/2024    CA 9.7 10/14/2024    OSMOCALC 287 10/14/2024    ALKPHO 142 (H) 10/14/2024    AST 13 10/14/2024    ALT 16 10/14/2024    BILT 0.2 (L) 10/14/2024    TP 7.5 10/14/2024    ALB 4.3 10/14/2024    GLOBULIN 3.2 10/14/2024     10/14/2024    K 4.0 10/14/2024     10/14/2024    CO2 28.0 10/14/2024     No results found for: \"EAG\", \"A1C\"  Lab Results   Component Value Date    CHOLEST 161 10/14/2024    TRIG 105 10/14/2024    HDL 47 10/14/2024    LDL 95 10/14/2024    VLDL 17 10/14/2024    NONHDLC 114 10/14/2024     Lab Results   Component Value Date    B12 799 10/14/2024     Lab Results   Component Value Date    VITD 37.0 10/14/2024       Medications Ordered Prior to Encounter[1]    ASSESSMENT/PLAN    ICD-10-CM    1. Therapeutic drug monitoring  Z51.81       2. Obesity, morbid, BMI 40.0-49.9 ([facility])  E66.01       3. Fatty liver  K76.0       4. Rheumatoid arthritis with positive rheumatoid factor, involving unspecified site ([facility])  M05.9           PLAN   Initial Weight Data and Goal Weight Loss:  Initial consult: #291 lbs on 3/2024  Weight Calculations  Initial Weight: 291 lbs  Initial Weight Date: 03/19/24  Today's Weight: 285 lbs  5% Goal: 14.55  10% Goal: 29.1  Total Weight Loss: 6 lbs  Total weight loss: Down 6 lbs total, Net loss 6 lbs  Will restart medications: phentermine 30mg   --advised of side effects and adverse effects of this medication  Contradictions: none  Reviewed labs  Continue with vitamin d OTC   HLD  elevated triglyceride, follows with PCP   Fatty liver, lifestyle education done, managed by PCP  RA/joint pain, recommended aqua therapy again  Wrote out macros and  encouraged tracking food   Nutrition: Low carb diet, recommended to eat breakfast daily/ regular protein intake  Follow up with dietitian and psychologist as recommended.  Discussed the role of sleep and stress in weight management.  Counseled on comprehensive weight loss plan including attention to nutrition, exercise and behavior/stress management for success. See patient instruction below for more details.  Discussed strategies to overcome barriers to successful weight loss and weight maintenance  FITTE: ACSM recommendations (150-300 minutes/ week in active weight loss)   Weight Loss Consent to treat reviewed and signed.    Total time spent on chart review, pre-charting, obtaining history, counseling, and educating, reviewing labs was 30 minutes.       NOTE TO PATIENT: The 21st Century Cures Act makes clinical notes like these available to patients in the interest of transparency. Clinical notes are medical documents used by physicians and care providers to communicate with each other. These documents include medical language and terminology, abbreviations, and treatment information that may sound technical and at times possibly unfamiliar. In addition, at times, the verbiage may appear blunt or direct. These documents are one tool providers use to communicate relevant information and clinical opinions of the care providers in a way that allows common understanding of the clinical context.     There are no Patient Instructions on file for this visit.    No follow-ups on file.    Patient verbalizes understanding.    NU Villegas             [1]   Current Outpatient Medications on File Prior to Visit   Medication Sig Dispense Refill    ENBREL SURECLICK 50 MG/ML Subcutaneous Solution Auto-injector Inject 50 mg into the skin once a week. 4.2 mL 5    methylPREDNISolone (MEDROL) 4 MG Oral Tablet Therapy Pack As directed. 1 each 0    methotrexate 2.5 MG Oral Tab Take 8 tablets (20 mg total) by mouth once a  week. 32 tablet 2    folic acid 1 MG Oral Tab Take 2 tablets (2 mg total) by mouth daily. 60 tablet 2     No current facility-administered medications on file prior to visit.

## 2024-11-25 NOTE — PATIENT INSTRUCTIONS
Next steps:  1.  Fill your prescribed medication and take as discussed and prescribed: phentermine 30mg   2.  Schedule a personal nutrition consultation with one of our registered dieticians     Please try to work on the following dietary changes:  Daily protein recommendation to start:  grams  Daily carbohydrate: <150g  Daily calories: 1,800  1.  Drink water with meals and throughout the day, cut down on soda and/or juice if consumed. Consider flavored water options like Bubbly, Spindrift, Hint and Mouna.  2.  Eat breakfast daily and focus on having protein with each meal, examples include: greek yogurt, cottage cheese, hard boiled egg, whole grain toast with peanut butter.   3.  Reduce refined carbohydrates and sugars which includes items such as sweets, as well as rice, pasta, and bread and make sure to choose whole grain options when having them with just 1 serving per meal about the size of your inner palm.  4.  Consume non starchy veggies daily working towards making them a good 50% of your daily food intake. Add them to lunch and dinner consistently.  5.  Start a daily probiotic: VSL#3 is recommended, (order on line at www.vsl3.com). Take 1 capsule daily with water for 30 days, then reduce to 1 every other day (this will reduce the cost). Capsules can be left out for 2 weeks, but then must be refrigerated.      Please download randall My Fitness Pal, LoseIt! Or Net Diary to monitor daily dietary intake and you will be able to see if you are eating the right amount of calories or too much or too little which would hinder weight loss. Additionally this will help to see your daily carbohydrate and protein intake. When you set the randall up choose 1-2 lbs/week as a goal.  Keeping a paper food journal is an option as well to remain accountable for your choices- this is the start to mindful eating! A low calorie diet has been consistently shown to support weight loss.     Continue or start exercising to help  establish a routine. If not already exercising begin with 1 day and progress as able with long-term goal of 30 minutes 5 days a week at a minimum.     Meditation daily can help manage and control stress. Chronic stress can make weight loss difficult.  Exercising is one way to help with stress, but meditation using the CALM Kaitlynn or another comparable alternative can be done in your home or place of work with little time commitment. This Kaitlynn can also help work on behavior change and improve sleep. Check out the segment under Calm Masterclass and listen to The 4 Pillars of Health. A great way to begin learning about the foundation of lifestyle with practical tips to use in your every day.     Check out www.yourweightmatters.org blog for continued daily support and education along this weight loss journey!    Patient Resources:     Personal Training/Fitness Classes/Health Coaching     [facility] @ [email or web address] Full fitness center with group fitness and personal training. Discount available as client of [facility].  Health Coaching and Personal Training with Stefanie Martines at our [facility]- individual weekly coaching with option to add personal training and small group fitness classes targeted at weight loss- [phone] and/or email @ [email or web address]  [employer]IT [place] [email or web address] Group Fitness [phone] and/or email Leonie at [email or web address]  [facility] Fitness Centers with multiple locations: Goodmail Systems (www.Adzerk), Eat The OutTrippin Fitness (www.Usetrace.Mercury Continuity), Fit Body Bootcamp (www.Snapd AppbodybootWhite Castlep.Mercury Continuity), Ynvisible (www.[place] WaVE.Mercury Continuity), The Exercise  (www.exercisecoach.Mercury Continuity)     Online Fitness  Fitness  on Utube  Fit in 10 DVD series- www.axwxo04YLP.com  Sit and Be Fit - Chair exercise series Www.sitandbefit.org  Hip Hop  Fit with Gene Ramírez at www.hiphopfit.net     Apps for on the Go Fitness  [place] 7 Minute Workout (orange box with white 7) - free on the go HIIT training kaitlynn  Peloton Kaitlynn @ www.onepeloton.com     Nutrition Trackers and Tools  LoseIT! And My Fitness Pal apps and on line for tracking nutrition  NOOM - virtual health coaching  FitFoundation (healthy meals on the go) in [place] @ www.lernxkezweabz5g.Jail Education Solutions  Bistro MD @ Ludi labsbistromd.com and Mwuoek46 (keto and low carb plans recommended) @ www.iolhsf86.com, Metabolic Meals @ www.MyMetabolicMeals.com - individual prepared meals to go  Gobble, Blue Apron, Home , Every Plate, Sunbasket- on line meal delivery programs for preparation at home  Meal Village in [place] for homemade meals to go @ www.mealvillage.Jail Education Solutions  Diet Doctor @ www.dietdoctor.Jail Education Solutions - low carb swaps  YuPellePharm - meal prep and planning kaitlynn (www.yummly.com)     Stress Management/Behavior/Mindful Eating  CALM meditation kaitlynn (www.calm.com)  Headspace  Am I Hungry? Mindful eating virtual  kaitlynn  Www.yourweightmatters.org - Obesity Action Coalition sponsored Blog posts daily  Motivation kaitlynn (black box with white \")- daily supportive messages sent to your phone     Books/Video Education/Podcasts  Mindless Eating by Donato Shukla  Why We Get Sick by Oleg Schneider (a book about insulin resistance)  Atomic Habits by Regulo Conway (a book about taking small steps to promote greater behavior change)   Can't Hurt Me by Mateusz Angel (a book exploring the power of discipline in achieving your goals)  The End of Dieting: How to Live for Life by Dr. Shawn Schofield M.D. or listen to The Pandoodle Podcast Episode 63: Understanding \"Nutritarian\" Eating w/Dr. Shawn Schofield  Your Body in Balance: The New Science of Food, Hormones, and Health by Dr. Arthur Cardenas  The Menopause Diet Plan by Kaitlin Sands and Nuzhat Cabrera  The Complete Guide to fasting by Dr. Freeman  Sugar, Salt & Fat by Shelby Jolley, Ph.D, R.D.  Weight Loss  Surgery Will Not Treat Food Addiction by Cecy Ibrahim Ph.D  The Game Changers- Resonateix Documentary on plant based nutrition  Fed Up - documentary about obesity (Free on Utube)  The Truth About Sugar - documentary on sugar (Free on Utube, https://youreadeou.be/3V9zczdCZ1v)  The Dr. Valles T5 Wellness Plan by Dr. Sonny Valles MD  Fitlosophy Fitspiration - journal to better health (found at [employer] in fitness aisle)  What Happened to You?- a look at the impact trauma has on behavior written by Aga Boston and Dr. Rosalio Winkler  Whole Again by Pete Bruno - discovering your true self after trauma  Lis Sosa talk on [employer], The Call to Courage  Podcasts: The Exam Room by the Physician's Committee, Nutrition Facts by Dr. Kendrick    We are here to support you with weight loss, but please remember that you still need your primary care provider for your routine health maintenance.

## 2024-11-27 ENCOUNTER — TELEPHONE (OUTPATIENT)
Dept: RHEUMATOLOGY | Facility: CLINIC | Age: 49
End: 2024-11-27

## 2024-11-27 NOTE — TELEPHONE ENCOUNTER
[facility] infusion called office requesting recent TB and Hepatitis testing. TB results faxed. Infusion center will draw Hep testing prior to her scheduled infusion.,

## 2024-12-03 NOTE — TELEPHONE ENCOUNTER
[facility] Order and fax confirmation send to scanning.

## 2024-12-16 ENCOUNTER — PATIENT MESSAGE (OUTPATIENT)
Dept: RHEUMATOLOGY | Facility: CLINIC | Age: 49
End: 2024-12-16

## 2024-12-16 DIAGNOSIS — M05.711 RHEUMATOID ARTHRITIS INVOLVING RIGHT SHOULDER WITH POSITIVE RHEUMATOID FACTOR (HCC): Primary | ICD-10-CM

## 2024-12-16 RX ORDER — METHYLPREDNISOLONE 4 MG/1
TABLET ORAL
Qty: 1 EACH | Refills: 0 | Status: SHIPPED | OUTPATIENT
Start: 2024-12-16

## 2024-12-16 NOTE — TELEPHONE ENCOUNTER
Phoned pt, pt states she only was allowed 2 days off with her FMLA. Pt woke up today with co wrist, and hand pain. Pt was not able to go to work due to the pain and swelling. Pt is requesting a doctor note as to why she was not able to work today, and requesting a Medrol does pack.     Pt received her first Orencia infusion on 12/12/24.     LOV: 11/6/24  Future Appointments   Date Time Provider Department Center   3/6/2025 10:40 AM Christie Thomas MD [facility] [address]   5/20/2025  8:40 AM Shakila Haro APRN [facility] [address]   9/15/2025  9:00 AM Shakila Haro APRN [facility] [address]

## 2025-01-14 ENCOUNTER — TELEPHONE (OUTPATIENT)
Dept: INTERNAL MEDICINE CLINIC | Facility: CLINIC | Age: 50
End: 2025-01-14

## 2025-05-30 DIAGNOSIS — Z51.81 THERAPEUTIC DRUG MONITORING: ICD-10-CM

## 2025-05-30 DIAGNOSIS — E66.01 OBESITY, MORBID, BMI 40.0-49.9 (HCC): ICD-10-CM

## 2025-05-30 RX ORDER — PHENTERMINE HYDROCHLORIDE 30 MG/1
30 CAPSULE ORAL EVERY MORNING
Qty: 30 CAPSULE | Refills: 0 | Status: SHIPPED | OUTPATIENT
Start: 2025-05-30

## 2025-05-30 NOTE — TELEPHONE ENCOUNTER
Requesting   Requested Prescriptions     Pending Prescriptions Disp Refills    PHENTERMINE HCL 30 MG Oral Cap [Pharmacy Med Name: PHENTERMINE HCL 30MG CAPSULES] 30 capsule 0     Sig: TAKE 1 CAPSULE(30 MG) BY MOUTH EVERY MORNING       LOV: 11/25/2024  RTC:  no appt  Last Relevant Labs: na  Filled: 11/25/2024 #30 capsule with 3 refills    No future appointments.

## 2025-07-17 ENCOUNTER — TELEPHONE (OUTPATIENT)
Age: 50
End: 2025-07-17

## 2025-07-17 NOTE — TELEPHONE ENCOUNTER
Patient is due for the following....    Colorectal Cancer Screening   Cervical Cancer Screening    Patient is due for a physical Left message to call back to schedule.

## 2025-07-18 NOTE — TELEPHONE ENCOUNTER
Called patient, no answer.    Left voicemail to call our office back for any updates at [phone] for :    Colorectal Cancer Screening   Cervical Cancer Screening

## (undated) NOTE — MR AVS SNAPSHOT
After Visit Summary   3/23/2024    Yvonne Suarez   MRN: [id number]           Visit Information     Date & Time  3/23/2024 10:00 AM Provider  [facility] Department  [facility], [place]ie Rudolph, [place] Dept. Phone  [phone]      Your Vitals Were     LMP   01/31/2024 (Approximate)             Allergies as of 3/23/2024  Review status set to Review Complete on 3/19/2024   Not on File     Your Current Medications        Dosage    Meloxicam 15 MG Oral Tab Take 1 tablet (15 mg total) by mouth daily.    folic acid 1 MG Oral Tab Take 2 tablets (2 mg total) by mouth in the morning and 2 tablets (2 mg total) before bedtime.    methotrexate 2.5 MG Oral Tab Take 8 tablets (20 mg total) by mouth once a week.    predniSONE 5 MG Oral Tab Take 1 tablet (5 mg total) by mouth as needed (Pt taking it when needed).      Diagnoses for This Visit    Therapeutic drug monitoring   [064353]    Obesity, morbid, BMI 40.0-49.9   [5476961]             We Ordered the Following     Normal Orders This Visit    EKG 12 Lead performed at [facility] [EKG1 CUSTOM]       Future Appointments        Provider Department    3/23/2024 10:00 AM BEULAH [facility], [place]ie Rudolph, [place]    3/30/2024 11:00 AM BEULAH [address]    7/3/2024 12:00 PM Christie Thomas [facility], [address] [place] [address]    7/24/2024 12:40 PM Shakila Haro [address]    10/10/2024 9:00 AM Arnulfo Oneill [address]                Did you know that [facility] primary care physicians now offer Video Visits through [employer] for adult patients for a variety of conditions such as allergies, back pain and cold symptoms? Skip the drive and waiting room and online chat with a doctor face-to-face using your web-cam enabled computer or mobile device wherever you are. Video Visits cost $50 and can  be paid hassle-free using a credit, debit, or health savings card.  Not active on Shopliment? Ask us how to get signed up today!          If you receive a survey from [employer], please take a few minutes to complete it and provide feedback. We strive to deliver the best patient experience and are looking for ways to make improvements. Your feedback will help us do so. For more information on [employer], please visit [email or web address]           No text in SmartText           No text in SmartText